# Patient Record
Sex: FEMALE | Race: WHITE | Employment: UNEMPLOYED | URBAN - METROPOLITAN AREA
[De-identification: names, ages, dates, MRNs, and addresses within clinical notes are randomized per-mention and may not be internally consistent; named-entity substitution may affect disease eponyms.]

---

## 2019-11-06 ENCOUNTER — HOSPITAL ENCOUNTER (OUTPATIENT)
Age: 19
Discharge: HOME | End: 2019-11-06
Attending: EMERGENCY MEDICINE
Payer: COMMERCIAL

## 2019-11-06 ENCOUNTER — APPOINTMENT (OUTPATIENT)
Dept: GENERAL RADIOLOGY | Age: 19
End: 2019-11-06
Attending: EMERGENCY MEDICINE
Payer: COMMERCIAL

## 2019-11-06 VITALS
OXYGEN SATURATION: 99 % | RESPIRATION RATE: 16 BRPM | HEART RATE: 88 BPM | SYSTOLIC BLOOD PRESSURE: 145 MMHG | DIASTOLIC BLOOD PRESSURE: 73 MMHG | TEMPERATURE: 99 F

## 2019-11-06 DIAGNOSIS — H66.90 ACUTE OTITIS MEDIA, UNSPECIFIED OTITIS MEDIA TYPE: Primary | ICD-10-CM

## 2019-11-06 DIAGNOSIS — J98.01 ACUTE BRONCHOSPASM: ICD-10-CM

## 2019-11-06 PROCEDURE — 99204 OFFICE O/P NEW MOD 45 MIN: CPT

## 2019-11-06 PROCEDURE — 71046 X-RAY EXAM CHEST 2 VIEWS: CPT | Performed by: EMERGENCY MEDICINE

## 2019-11-06 PROCEDURE — 94640 AIRWAY INHALATION TREATMENT: CPT

## 2019-11-06 RX ORDER — PREDNISONE 20 MG/1
60 TABLET ORAL DAILY
Qty: 15 TABLET | Refills: 0 | Status: SHIPPED | OUTPATIENT
Start: 2019-11-06 | End: 2019-11-11

## 2019-11-06 RX ORDER — ALBUTEROL SULFATE 2.5 MG/3ML
2.5 SOLUTION RESPIRATORY (INHALATION) ONCE
Status: COMPLETED | OUTPATIENT
Start: 2019-11-06 | End: 2019-11-06

## 2019-11-06 RX ORDER — ALBUTEROL SULFATE 90 UG/1
2 AEROSOL, METERED RESPIRATORY (INHALATION) EVERY 4 HOURS PRN
Qty: 1 INHALER | Refills: 0 | Status: SHIPPED | OUTPATIENT
Start: 2019-11-06 | End: 2019-12-06

## 2019-11-06 RX ORDER — AZITHROMYCIN 250 MG/1
TABLET, FILM COATED ORAL
Qty: 1 PACKAGE | Refills: 0 | Status: SHIPPED | OUTPATIENT
Start: 2019-11-06 | End: 2019-11-11

## 2019-11-07 NOTE — ED INITIAL ASSESSMENT (HPI)
Pt with c/o for the past few weeks. Pt c/o nasal congestion and right ear pain. Pt states sudden ear pain this morning when blowing nose. Pt denies fevers.   Pt does vape daily

## 2019-11-07 NOTE — ED PROVIDER NOTES
Patient Seen in: [address] [facility] [place]      History   Patient presents with:  Cough/URI    Stated Complaint: cough,congestion    HPI    This is a 18-year-old female who arrives here with complaints of symptoms of the last 2 to 3 weeks.   She edson Regular without murmurs    Abdomen: Soft nontender without any rebound, guarding    Extremity: There is no rash . There is is good pulses bilaterally. There is no calf tenderness bilaterally. There is no edema    Neuro: Alert and oriented.   Patient is m Tab  500 mg once followed by 250 mg daily x 4 days  Qty: 1 Package Refills: 0

## 2020-09-07 ENCOUNTER — HOSPITAL ENCOUNTER (OUTPATIENT)
Age: 20
Discharge: HOME | End: 2020-09-07
Payer: COMMERCIAL

## 2020-09-07 ENCOUNTER — APPOINTMENT (OUTPATIENT)
Dept: GENERAL RADIOLOGY | Age: 20
End: 2020-09-07
Attending: NURSE PRACTITIONER
Payer: COMMERCIAL

## 2020-09-07 VITALS
OXYGEN SATURATION: 99 % | SYSTOLIC BLOOD PRESSURE: 127 MMHG | HEART RATE: 96 BPM | RESPIRATION RATE: 16 BRPM | TEMPERATURE: 98 F | DIASTOLIC BLOOD PRESSURE: 78 MMHG

## 2020-09-07 DIAGNOSIS — L97.512 SKIN ULCER OF TOE OF RIGHT FOOT WITH FAT LAYER EXPOSED (HCC): Primary | ICD-10-CM

## 2020-09-07 DIAGNOSIS — L03.031 CELLULITIS OF SECOND TOE OF RIGHT FOOT: ICD-10-CM

## 2020-09-07 PROCEDURE — 73660 X-RAY EXAM OF TOE(S): CPT | Performed by: NURSE PRACTITIONER

## 2020-09-07 PROCEDURE — 90471 IMMUNIZATION ADMIN: CPT | Performed by: NURSE PRACTITIONER

## 2020-09-07 PROCEDURE — 90715 TDAP VACCINE 7 YRS/> IM: CPT | Performed by: NURSE PRACTITIONER

## 2020-09-07 PROCEDURE — 99213 OFFICE O/P EST LOW 20 MIN: CPT | Performed by: NURSE PRACTITIONER

## 2020-09-07 PROCEDURE — L3260 AMBULATORY SURGICAL BOOT EAC: HCPCS | Performed by: NURSE PRACTITIONER

## 2020-09-07 RX ORDER — CEFADROXIL 500 MG/1
500 CAPSULE ORAL 2 TIMES DAILY
Qty: 20 CAPSULE | Refills: 0 | Status: SHIPPED | OUTPATIENT
Start: 2020-09-07 | End: 2020-09-17

## 2020-09-07 NOTE — ED PROVIDER NOTES
Patient Seen in: THE [facility] [place]      History   Patient presents with:  Cellulitis    Stated Complaint: right toe injury    HPI  50-year-old female presents to the immediate care complaining of right second toe redness and ulcers.   She ha Heart sounds: Normal heart sounds. Pulmonary:      Effort: Pulmonary effort is normal.      Breath sounds: Normal breath sounds. Musculoskeletal:        Feet:    Skin:     General: Skin is warm and dry.       Capillary Refill: Capillary refill takes PROCEDURE:  XR TOE(S) (MIN 2 VIEWS), RIGHT 3RD (CPT=73660)  TECHNIQUE:  Three views were obtained.   COMPARISON:  [facility], XR, XR TOE(S) (MIN 2 VIEWS), RIGHT 2ND (CPT=73660), 9/07/2020, 12:24 PM.  INDICATIONS:  right toe injury  PATIENT

## 2020-09-07 NOTE — ED INITIAL ASSESSMENT (HPI)
Pt states she hit her right 2nd toe hard on a door last week. States she bandaged it too tight and developed blisters. States keeping it clean with antibiotic cream and yesterday noticed it was painful.  Pt looked at it and noticed it was red, swollen and t

## 2020-09-09 ENCOUNTER — OFFICE VISIT (OUTPATIENT)
Dept: PODIATRY CLINIC | Facility: CLINIC | Age: 20
End: 2020-09-09
Payer: COMMERCIAL

## 2020-09-09 DIAGNOSIS — M20.40 HAMMER TOE, UNSPECIFIED LATERALITY: ICD-10-CM

## 2020-09-09 DIAGNOSIS — L03.031 CELLULITIS OF TOE OF RIGHT FOOT: ICD-10-CM

## 2020-09-09 DIAGNOSIS — L97.519 ULCER OF TOE OF RIGHT FOOT, UNSPECIFIED ULCER STAGE (HCC): Primary | ICD-10-CM

## 2020-09-09 PROCEDURE — 99203 OFFICE O/P NEW LOW 30 MIN: CPT | Performed by: PODIATRIST

## 2020-09-09 RX ORDER — AMOXICILLIN AND CLAVULANATE POTASSIUM 500; 125 MG/1; MG/1
1 TABLET, FILM COATED ORAL 2 TIMES DAILY
Qty: 20 TABLET | Refills: 0 | Status: SHIPPED | OUTPATIENT
Start: 2020-09-09 | End: 2021-04-23

## 2020-09-09 NOTE — PROGRESS NOTES
Giovanny Bal is a 23year old female. Patient presents with:  Consult: right 2nd toe -- Stubbed toe a couple of weeks ago at home on door frame and wrapped toe to tight. Rates pain 4/10. Taking oral abx. Denies any drainage. Xrays taken.        HPI: tablet 2   • ibuprofen 600 MG Oral Tab Take 600 mg by mouth every 6 (six) hours as needed for Pain. Past Medical History:   Diagnosis Date   • Depression    • Self-inflicted injury       History reviewed. No pertinent surgical history.    Family Hist cm with minimal localized redness which she states is much better than what it was with treatment with antibiotics.   The area of the blistering she describes on lateral aspect of her right second toe, the medial aspect of her right third toe, and the later is to wear the accommodative foam toe spacer daily as directed in her right first webspace to better offload the area of the ulceration daily as directed.   In addition, she is to wear the, accommodative Silipos toe spacers in her right second and third web

## 2020-09-11 NOTE — PROGRESS NOTES
Reviewed culture results. Preliminary aerobic results show no growth. Anaerobic results are still pending.   [id number] Ritika Fall 9/11/2020

## 2020-09-16 ENCOUNTER — OFFICE VISIT (OUTPATIENT)
Dept: PODIATRY CLINIC | Facility: CLINIC | Age: 20
End: 2020-09-16
Payer: COMMERCIAL

## 2020-09-16 DIAGNOSIS — L97.519 ULCER OF TOE OF RIGHT FOOT, UNSPECIFIED ULCER STAGE (HCC): Primary | ICD-10-CM

## 2020-09-16 DIAGNOSIS — L03.031 CELLULITIS OF TOE OF RIGHT FOOT: ICD-10-CM

## 2020-09-16 DIAGNOSIS — M20.40 HAMMER TOE, UNSPECIFIED LATERALITY: ICD-10-CM

## 2020-09-16 PROCEDURE — 99213 OFFICE O/P EST LOW 20 MIN: CPT | Performed by: PODIATRIST

## 2020-09-16 RX ORDER — AMOXICILLIN AND CLAVULANATE POTASSIUM 500; 125 MG/1; MG/1
1 TABLET, FILM COATED ORAL 2 TIMES DAILY
Qty: 14 TABLET | Refills: 0 | Status: SHIPPED | OUTPATIENT
Start: 2020-09-16 | End: 2021-04-23

## 2020-09-16 NOTE — PROGRESS NOTES
Hugo Nobles is a 23year old female. Patient presents with: Follow - Up: 2nd toe right foot ulcer - denies any pain. HPI:     This 13-year-old female patient presents to clinic today for follow-up.   She states of the affected areas on her toes on History    Socioeconomic History      Marital status: Single      Spouse name: Not on file      Number of children: Not on file      Years of education: Not on file      Highest education level: Not on file    Tobacco Use      Smoking status: Former Smoker RERERRAL TO WOUND VISIT    Cellulitis of toe of right foot  -     Amoxicillin-Pot Clavulanate (AUGMENTIN) 500-125 MG Oral Tab; Take 1 tablet by mouth 2 (two) times a day.   -     OP RERERRAL TO WOUND VISIT    Hammer toe, unspecified laterality  -     Amoxic given a referral for a wound care consult for further evaluation and treatment of her ulcer with instructions to call the wound care clinic and to make an appointment.   With instructions to discussed the importance of compliance and the risks of noncomplia

## 2020-09-16 NOTE — PROGRESS NOTES
Reviewed final aerobic and anaerobic culture results both of which showed no growth.   [id number] Ritika [facility] 9/15/20

## 2020-09-23 ENCOUNTER — OFFICE VISIT (OUTPATIENT)
Dept: PODIATRY CLINIC | Facility: CLINIC | Age: 20
End: 2020-09-23
Payer: COMMERCIAL

## 2020-09-23 DIAGNOSIS — M20.40 HAMMER TOE, UNSPECIFIED LATERALITY: ICD-10-CM

## 2020-09-23 DIAGNOSIS — L97.519 ULCER OF TOE OF RIGHT FOOT, UNSPECIFIED ULCER STAGE (HCC): Primary | ICD-10-CM

## 2020-09-23 DIAGNOSIS — L03.031 CELLULITIS OF TOE OF RIGHT FOOT: ICD-10-CM

## 2020-09-23 PROCEDURE — 99213 OFFICE O/P EST LOW 20 MIN: CPT | Performed by: PODIATRIST

## 2020-09-23 RX ORDER — AMOXICILLIN AND CLAVULANATE POTASSIUM 875; 125 MG/1; MG/1
1 TABLET, FILM COATED ORAL 2 TIMES DAILY
Qty: 20 TABLET | Refills: 0 | Status: SHIPPED | OUTPATIENT
Start: 2020-09-23 | End: 2021-04-23

## 2020-11-14 ENCOUNTER — APPOINTMENT (OUTPATIENT)
Dept: GENERAL RADIOLOGY | Age: 20
End: 2020-11-14
Attending: PHYSICIAN ASSISTANT
Payer: COMMERCIAL

## 2020-11-14 ENCOUNTER — HOSPITAL ENCOUNTER (OUTPATIENT)
Age: 20
Discharge: HOME | End: 2020-11-14
Payer: COMMERCIAL

## 2020-11-14 VITALS
DIASTOLIC BLOOD PRESSURE: 66 MMHG | SYSTOLIC BLOOD PRESSURE: 125 MMHG | RESPIRATION RATE: 16 BRPM | TEMPERATURE: 99 F | HEART RATE: 80 BPM | OXYGEN SATURATION: 99 %

## 2020-11-14 DIAGNOSIS — R07.89 CHEST PAIN, MUSCULOSKELETAL: Primary | ICD-10-CM

## 2020-11-14 PROCEDURE — 93000 ELECTROCARDIOGRAM COMPLETE: CPT | Performed by: PHYSICIAN ASSISTANT

## 2020-11-14 PROCEDURE — 99214 OFFICE O/P EST MOD 30 MIN: CPT | Performed by: PHYSICIAN ASSISTANT

## 2020-11-14 PROCEDURE — 71046 X-RAY EXAM CHEST 2 VIEWS: CPT | Performed by: PHYSICIAN ASSISTANT

## 2020-11-14 NOTE — ED PROVIDER NOTES
Patient Seen in: Immediate [address] [place] [address]      History   Patient presents with:  Chest Pain: Entered by patient    Stated Complaint: CHEST PAIN WHEN BREATHING IN    HPI    19-year-old female with past medical history as noted below presents to the immediate Neck:      Musculoskeletal: Normal range of motion and neck supple. Cardiovascular:      Rate and Rhythm: Normal rate and regular rhythm. Pulmonary:      Breath sounds: Normal breath sounds. Chest:      Chest wall: Tenderness (midsternal) present.

## 2020-11-14 NOTE — ED INITIAL ASSESSMENT (HPI)
Pt c/o mid sternal chest pain. Pain present for 2 weeks but worse over the last couple of days. Patient reports pain is worse with movement and is tender over sternum.

## 2022-01-19 ENCOUNTER — OFFICE VISIT (OUTPATIENT)
Dept: FAMILY MEDICINE CLINIC | Facility: CLINIC | Age: 22
End: 2022-01-19
Payer: COMMERCIAL

## 2022-01-19 VITALS
SYSTOLIC BLOOD PRESSURE: 108 MMHG | WEIGHT: 161 LBS | DIASTOLIC BLOOD PRESSURE: 72 MMHG | BODY MASS INDEX: 23.85 KG/M2 | RESPIRATION RATE: 16 BRPM | OXYGEN SATURATION: 98 % | TEMPERATURE: 97 F | HEIGHT: 69 IN | HEART RATE: 101 BPM

## 2022-01-19 DIAGNOSIS — R10.9 ABDOMINAL DISCOMFORT: Primary | ICD-10-CM

## 2022-01-19 PROBLEM — F60.3 BORDERLINE PERSONALITY DISORDER (HCC): Status: ACTIVE | Noted: 2022-01-19

## 2022-01-19 PROCEDURE — 3008F BODY MASS INDEX DOCD: CPT | Performed by: PHYSICIAN ASSISTANT

## 2022-01-19 PROCEDURE — 3078F DIAST BP <80 MM HG: CPT | Performed by: PHYSICIAN ASSISTANT

## 2022-01-19 PROCEDURE — 99203 OFFICE O/P NEW LOW 30 MIN: CPT | Performed by: PHYSICIAN ASSISTANT

## 2022-01-19 PROCEDURE — 3074F SYST BP LT 130 MM HG: CPT | Performed by: PHYSICIAN ASSISTANT

## 2022-01-19 NOTE — PROGRESS NOTES
Patient presents with:  Establish Care: New Patient   Pain: LLQ Abd Pain x1week       HISTORY OF PRESENT ILLNESS  Shobha Owens is a 24year old female who presents for  - LLQ discomfort: Feels like a \"hollow baseball\" for the last week.  Feels better appropriately dressed. CARDIOVASCULAR: Regular rate and rhythm. PULMONOLOGY: Normal effort on room air. Clear to auscultation bilaterally. ABDOMEN: Soft, nondistended. Mild left sided abdominal tenderness without rebound or guarding.  No hepatosplenomega

## 2022-01-26 ENCOUNTER — OFFICE VISIT (OUTPATIENT)
Dept: FAMILY MEDICINE CLINIC | Facility: CLINIC | Age: 22
End: 2022-01-26
Payer: COMMERCIAL

## 2022-01-26 VITALS
HEIGHT: 69 IN | OXYGEN SATURATION: 99 % | HEART RATE: 99 BPM | TEMPERATURE: 98 F | BODY MASS INDEX: 23.7 KG/M2 | WEIGHT: 160 LBS | DIASTOLIC BLOOD PRESSURE: 72 MMHG | SYSTOLIC BLOOD PRESSURE: 104 MMHG | RESPIRATION RATE: 16 BRPM

## 2022-01-26 DIAGNOSIS — R10.9 LEFT SIDED ABDOMINAL PAIN: Primary | ICD-10-CM

## 2022-01-26 PROCEDURE — 3008F BODY MASS INDEX DOCD: CPT | Performed by: PHYSICIAN ASSISTANT

## 2022-01-26 PROCEDURE — 3078F DIAST BP <80 MM HG: CPT | Performed by: PHYSICIAN ASSISTANT

## 2022-01-26 PROCEDURE — 99213 OFFICE O/P EST LOW 20 MIN: CPT | Performed by: PHYSICIAN ASSISTANT

## 2022-01-26 PROCEDURE — 3074F SYST BP LT 130 MM HG: CPT | Performed by: PHYSICIAN ASSISTANT

## 2022-01-27 ENCOUNTER — HOSPITAL ENCOUNTER (OUTPATIENT)
Dept: GENERAL RADIOLOGY | Age: 22
Discharge: HOME | End: 2022-01-27
Attending: PHYSICIAN ASSISTANT
Payer: COMMERCIAL

## 2022-01-27 DIAGNOSIS — R10.9 LEFT SIDED ABDOMINAL PAIN: ICD-10-CM

## 2022-01-27 PROCEDURE — 74019 RADEX ABDOMEN 2 VIEWS: CPT | Performed by: PHYSICIAN ASSISTANT

## 2022-01-31 ENCOUNTER — LAB ENCOUNTER (OUTPATIENT)
Dept: LAB | Age: 22
End: 2022-01-31
Attending: PHYSICIAN ASSISTANT
Payer: COMMERCIAL

## 2022-01-31 DIAGNOSIS — R10.9 LEFT SIDED ABDOMINAL PAIN: ICD-10-CM

## 2022-01-31 PROCEDURE — 83993 ASSAY FOR CALPROTECTIN FECAL: CPT

## 2022-01-31 PROCEDURE — 87338 HPYLORI STOOL AG IA: CPT

## 2022-01-31 NOTE — PROGRESS NOTES
Patient presents with:  Abdominal Pain: LLQ, ongoing       HISTORY OF PRESENT ILLNESS  Jose C Jeremias is a 24year old female who presents for follow up left sided abdominal pain.  Notes that she had slight relief with mag citrate but not complete improve dressed. CARDIOVASCULAR: Regular rate and rhythm. PULMONOLOGY: Normal effort on room air. Clear to auscultation bilaterally. ABDOMEN: Soft, nondistended. Mild tenderness left side of abdomen without rebound or guarding. No hepatosplenomegaly.  No CVA ten

## 2022-02-02 LAB — HELICOBACTER PYLORI AG, FECAL: NEGATIVE

## 2022-02-03 LAB — CALPROTECTIN, FECAL: 76 UG/G

## 2022-06-28 ENCOUNTER — TELEPHONE (OUTPATIENT)
Dept: FAMILY MEDICINE CLINIC | Facility: CLINIC | Age: 22
End: 2022-06-28

## 2022-06-28 NOTE — TELEPHONE ENCOUNTER
[facility] [address] [place] [place] [address] from 02/10/2020 to present. All records located in [address] [place] [address] in which request was sent to [employer]. Contact Yana BENITO6 [phone] ext [phone]  Case ID # [id number]

## 2022-07-25 ENCOUNTER — LAB ENCOUNTER (OUTPATIENT)
Dept: LAB | Facility: HOSPITAL | Age: 22
End: 2022-07-25
Attending: INTERNAL MEDICINE
Payer: COMMERCIAL

## 2022-07-25 DIAGNOSIS — Z01.812 ENCOUNTER FOR PREPROCEDURE SCREENING LABORATORY TESTING FOR COVID-19: ICD-10-CM

## 2022-07-25 DIAGNOSIS — Z20.822 ENCOUNTER FOR PREPROCEDURE SCREENING LABORATORY TESTING FOR COVID-19: ICD-10-CM

## 2022-07-25 LAB — SARS-COV-2 RNA RESP QL NAA+PROBE: NOT DETECTED

## 2022-09-23 ENCOUNTER — LAB ENCOUNTER (OUTPATIENT)
Dept: LAB | Facility: HOSPITAL | Age: 22
End: 2022-09-23
Attending: INTERNAL MEDICINE

## 2022-09-23 DIAGNOSIS — K92.1 HEMATOCHEZIA: ICD-10-CM

## 2022-09-23 DIAGNOSIS — R14.0 BLOATING: ICD-10-CM

## 2022-09-23 DIAGNOSIS — R10.9 LEFT SIDED ABDOMINAL PAIN: ICD-10-CM

## 2022-09-23 LAB
BASOPHILS # BLD AUTO: 0.05 X10(3) UL (ref 0–0.2)
BASOPHILS NFR BLD AUTO: 1.2 %
EOSINOPHIL # BLD AUTO: 0.14 X10(3) UL (ref 0–0.7)
EOSINOPHIL NFR BLD AUTO: 3.3 %
ERYTHROCYTE [DISTWIDTH] IN BLOOD BY AUTOMATED COUNT: 12.2 %
HCT VFR BLD AUTO: 38.2 %
HGB BLD-MCNC: 12.7 G/DL
IGA SERPL-MCNC: 267 MG/DL (ref 70–312)
IMM GRANULOCYTES # BLD AUTO: 0.01 X10(3) UL (ref 0–1)
IMM GRANULOCYTES NFR BLD: 0.2 %
LYMPHOCYTES # BLD AUTO: 1.34 X10(3) UL (ref 1–4)
LYMPHOCYTES NFR BLD AUTO: 32.1 %
MCH RBC QN AUTO: 28.9 PG (ref 26–34)
MCHC RBC AUTO-ENTMCNC: 33.2 G/DL (ref 31–37)
MCV RBC AUTO: 86.8 FL
MONOCYTES # BLD AUTO: 0.27 X10(3) UL (ref 0.1–1)
MONOCYTES NFR BLD AUTO: 6.5 %
NEUTROPHILS # BLD AUTO: 2.37 X10 (3) UL (ref 1.5–7.7)
NEUTROPHILS # BLD AUTO: 2.37 X10(3) UL (ref 1.5–7.7)
NEUTROPHILS NFR BLD AUTO: 56.7 %
PLATELET # BLD AUTO: 247 10(3)UL (ref 150–450)
RBC # BLD AUTO: 4.4 X10(6)UL
WBC # BLD AUTO: 4.2 X10(3) UL (ref 4–11)

## 2022-09-23 PROCEDURE — 82784 ASSAY IGA/IGD/IGG/IGM EACH: CPT

## 2022-09-23 PROCEDURE — 36415 COLL VENOUS BLD VENIPUNCTURE: CPT

## 2022-09-23 PROCEDURE — 85025 COMPLETE CBC W/AUTO DIFF WBC: CPT

## 2022-09-27 ENCOUNTER — OFFICE VISIT (OUTPATIENT)
Dept: FAMILY MEDICINE CLINIC | Facility: CLINIC | Age: 22
End: 2022-09-27
Payer: COMMERCIAL

## 2022-09-27 VITALS
SYSTOLIC BLOOD PRESSURE: 120 MMHG | HEIGHT: 68 IN | DIASTOLIC BLOOD PRESSURE: 70 MMHG | RESPIRATION RATE: 16 BRPM | HEART RATE: 74 BPM | BODY MASS INDEX: 22.53 KG/M2 | WEIGHT: 148.63 LBS

## 2022-09-27 DIAGNOSIS — S91.019A: Primary | ICD-10-CM

## 2022-10-06 ENCOUNTER — LAB ENCOUNTER (OUTPATIENT)
Dept: LAB | Age: 22
End: 2022-10-06
Attending: FAMILY MEDICINE
Payer: COMMERCIAL

## 2022-10-06 DIAGNOSIS — R14.0 BLOATING: ICD-10-CM

## 2022-10-06 DIAGNOSIS — R10.9 LEFT SIDED ABDOMINAL PAIN: ICD-10-CM

## 2022-10-06 LAB
CRYPTOSP AG STL QL IA: NEGATIVE
G LAMBLIA AG STL QL IA: NEGATIVE

## 2022-10-06 PROCEDURE — 87329 GIARDIA AG IA: CPT

## 2022-10-06 PROCEDURE — 83993 ASSAY FOR CALPROTECTIN FECAL: CPT

## 2022-10-06 PROCEDURE — 82656 EL-1 FECAL QUAL/SEMIQ: CPT

## 2022-10-06 PROCEDURE — 82705 FATS/LIPIDS FECES QUAL: CPT

## 2022-10-06 PROCEDURE — 87272 CRYPTOSPORIDIUM AG IF: CPT

## 2022-10-08 LAB
NEUTRAL FAT, FECAL: NORMAL
SPLIT FAT, FECAL: NORMAL

## 2022-10-10 LAB — CALPROTECTIN STL-MCNT: 5.87 ΜG/G (ref ?–50)

## 2022-10-11 LAB — PANCREATIC ELASTASE , FECAL: 224 UG/G

## 2022-11-08 PROBLEM — R10.84 GENERALIZED ABDOMINAL PAIN: Status: ACTIVE | Noted: 2022-11-08

## 2022-11-08 PROBLEM — K59.00 CONSTIPATION: Status: ACTIVE | Noted: 2022-11-08

## 2022-11-08 PROBLEM — R63.4 WEIGHT LOSS: Status: ACTIVE | Noted: 2022-11-08

## 2022-11-14 ENCOUNTER — HOSPITAL ENCOUNTER (OUTPATIENT)
Dept: NUCLEAR MEDICINE | Facility: HOSPITAL | Age: 22
Discharge: HOME | End: 2022-11-14
Attending: INTERNAL MEDICINE
Payer: COMMERCIAL

## 2022-11-14 DIAGNOSIS — R10.84 GENERALIZED ABDOMINAL PAIN: ICD-10-CM

## 2022-11-14 PROCEDURE — 78227 HEPATOBIL SYST IMAGE W/DRUG: CPT | Performed by: INTERNAL MEDICINE

## 2022-11-15 NOTE — PROGRESS NOTES
HIDA scan noted with normal EF but with pain with CCK administration. We will place referral for general surgery. Mychart message sent.

## 2022-12-01 ENCOUNTER — OFFICE VISIT (OUTPATIENT)
Facility: LOCATION | Age: 22
End: 2022-12-01
Payer: COMMERCIAL

## 2022-12-01 VITALS — HEART RATE: 80 BPM | TEMPERATURE: 97 F

## 2022-12-01 DIAGNOSIS — R10.11 RIGHT UPPER QUADRANT ABDOMINAL PAIN: Primary | ICD-10-CM

## 2022-12-01 PROCEDURE — 99204 OFFICE O/P NEW MOD 45 MIN: CPT | Performed by: SURGERY

## 2022-12-02 ENCOUNTER — PATIENT MESSAGE (OUTPATIENT)
Dept: FAMILY MEDICINE CLINIC | Facility: CLINIC | Age: 22
End: 2022-12-02

## 2022-12-15 NOTE — PROGRESS NOTES
Krisit Huntley is a 23year old female. Patient presents with: Follow - Up: 2nd toe right foot ulcer - denies any pain    HPI:     This 18-year-old female patient returns to clinic today for follow-up of the ulcer on her right second toe.   She returns t Ok thank you for update! For now let's continue with current medication and when checking bp at home please rest for at least 5 to 10 minutes before she checks her pressure.      Female          of heroin overdose   • Bipolar Disorder Maternal Aunt    • Depression Paternal Aunt    • Diabetes Neg    • Obesity Neg    • Psychiatric Neg       Social History    Socioeconomic History      Marital status: Single      Spouse name: Not groups are graded 5 out of 5 in the foot and ankle.     ASSESSMENT AND PLAN:   Diagnoses and all orders for this visit:    Ulcer of toe of right foot, unspecified ulcer stage (HCC)    Cellulitis of toe of right foot    Hammer toe, unspecified laterality e-scribed this date. Reviewed the various acute manifestations of infection and informed her to go to the nearest ER if signs/symptoms worsen and/or if she experiences any new manifestations.   She will monitor the ulcerated area along with the remainder

## 2022-12-19 ENCOUNTER — OFFICE VISIT (OUTPATIENT)
Dept: FAMILY MEDICINE CLINIC | Facility: CLINIC | Age: 22
End: 2022-12-19
Payer: COMMERCIAL

## 2022-12-19 VITALS
RESPIRATION RATE: 16 BRPM | DIASTOLIC BLOOD PRESSURE: 60 MMHG | BODY MASS INDEX: 20.27 KG/M2 | SYSTOLIC BLOOD PRESSURE: 100 MMHG | TEMPERATURE: 98 F | HEIGHT: 69.5 IN | HEART RATE: 78 BPM | WEIGHT: 140 LBS

## 2022-12-19 DIAGNOSIS — R25.1 TREMULOUSNESS: ICD-10-CM

## 2022-12-19 DIAGNOSIS — R63.4 WEIGHT LOSS, UNINTENTIONAL: Primary | ICD-10-CM

## 2022-12-19 DIAGNOSIS — F41.9 ANXIETY: ICD-10-CM

## 2022-12-19 DIAGNOSIS — Z83.49 FAMILY HISTORY OF THYROID DISEASE: ICD-10-CM

## 2022-12-19 PROCEDURE — 3078F DIAST BP <80 MM HG: CPT | Performed by: STUDENT IN AN ORGANIZED HEALTH CARE EDUCATION/TRAINING PROGRAM

## 2022-12-19 PROCEDURE — 99213 OFFICE O/P EST LOW 20 MIN: CPT | Performed by: STUDENT IN AN ORGANIZED HEALTH CARE EDUCATION/TRAINING PROGRAM

## 2022-12-19 PROCEDURE — 3008F BODY MASS INDEX DOCD: CPT | Performed by: STUDENT IN AN ORGANIZED HEALTH CARE EDUCATION/TRAINING PROGRAM

## 2022-12-19 PROCEDURE — 3074F SYST BP LT 130 MM HG: CPT | Performed by: STUDENT IN AN ORGANIZED HEALTH CARE EDUCATION/TRAINING PROGRAM

## 2022-12-19 RX ORDER — LINACLOTIDE 290 UG/1
290 CAPSULE, GELATIN COATED ORAL DAILY
COMMUNITY
Start: 2022-11-28 | End: 2022-12-19 | Stop reason: ALTCHOICE

## 2023-01-05 ENCOUNTER — LAB ENCOUNTER (OUTPATIENT)
Dept: LAB | Age: 23
End: 2023-01-05
Attending: STUDENT IN AN ORGANIZED HEALTH CARE EDUCATION/TRAINING PROGRAM
Payer: COMMERCIAL

## 2023-01-05 DIAGNOSIS — Z83.49 FAMILY HISTORY OF THYROID DISEASE: ICD-10-CM

## 2023-01-05 DIAGNOSIS — R25.1 TREMULOUSNESS: ICD-10-CM

## 2023-01-05 DIAGNOSIS — F41.9 ANXIETY: ICD-10-CM

## 2023-01-05 LAB
ALBUMIN SERPL-MCNC: 3.9 G/DL (ref 3.4–5)
ALBUMIN/GLOB SERPL: 1.2 {RATIO} (ref 1–2)
ALP LIVER SERPL-CCNC: 53 U/L
ALT SERPL-CCNC: 21 U/L
ANION GAP SERPL CALC-SCNC: 9 MMOL/L (ref 0–18)
AST SERPL-CCNC: 17 U/L (ref 15–37)
BASOPHILS # BLD AUTO: 0.06 X10(3) UL (ref 0–0.2)
BASOPHILS NFR BLD AUTO: 1.3 %
BILIRUB SERPL-MCNC: 0.4 MG/DL (ref 0.1–2)
BUN BLD-MCNC: 9 MG/DL (ref 7–18)
CALCIUM BLD-MCNC: 8.7 MG/DL (ref 8.5–10.1)
CHLORIDE SERPL-SCNC: 112 MMOL/L (ref 98–112)
CO2 SERPL-SCNC: 21 MMOL/L (ref 21–32)
CREAT BLD-MCNC: 0.85 MG/DL
EOSINOPHIL # BLD AUTO: 0.12 X10(3) UL (ref 0–0.7)
EOSINOPHIL NFR BLD AUTO: 2.6 %
ERYTHROCYTE [DISTWIDTH] IN BLOOD BY AUTOMATED COUNT: 12.4 %
FASTING STATUS PATIENT QL REPORTED: YES
GFR SERPLBLD BASED ON 1.73 SQ M-ARVRAT: 99 ML/MIN/1.73M2 (ref 60–?)
GLOBULIN PLAS-MCNC: 3.2 G/DL (ref 2.8–4.4)
GLUCOSE BLD-MCNC: 98 MG/DL (ref 70–99)
HCT VFR BLD AUTO: 39.2 %
HGB BLD-MCNC: 13 G/DL
IMM GRANULOCYTES # BLD AUTO: 0.01 X10(3) UL (ref 0–1)
IMM GRANULOCYTES NFR BLD: 0.2 %
LYMPHOCYTES # BLD AUTO: 1.25 X10(3) UL (ref 1–4)
LYMPHOCYTES NFR BLD AUTO: 26.7 %
MCH RBC QN AUTO: 29 PG (ref 26–34)
MCHC RBC AUTO-ENTMCNC: 33.2 G/DL (ref 31–37)
MCV RBC AUTO: 87.3 FL
MONOCYTES # BLD AUTO: 0.27 X10(3) UL (ref 0.1–1)
MONOCYTES NFR BLD AUTO: 5.8 %
NEUTROPHILS # BLD AUTO: 2.98 X10 (3) UL (ref 1.5–7.7)
NEUTROPHILS # BLD AUTO: 2.98 X10(3) UL (ref 1.5–7.7)
NEUTROPHILS NFR BLD AUTO: 63.4 %
OSMOLALITY SERPL CALC.SUM OF ELEC: 293 MOSM/KG (ref 275–295)
PLATELET # BLD AUTO: 217 10(3)UL (ref 150–450)
POTASSIUM SERPL-SCNC: 4 MMOL/L (ref 3.5–5.1)
PROT SERPL-MCNC: 7.1 G/DL (ref 6.4–8.2)
RBC # BLD AUTO: 4.49 X10(6)UL
SODIUM SERPL-SCNC: 142 MMOL/L (ref 136–145)
TSI SER-ACNC: 2.81 MIU/ML (ref 0.36–3.74)
WBC # BLD AUTO: 4.7 X10(3) UL (ref 4–11)

## 2023-01-05 PROCEDURE — 36415 COLL VENOUS BLD VENIPUNCTURE: CPT

## 2023-01-05 PROCEDURE — 80053 COMPREHEN METABOLIC PANEL: CPT

## 2023-01-05 PROCEDURE — 85025 COMPLETE CBC W/AUTO DIFF WBC: CPT

## 2023-01-05 PROCEDURE — 84443 ASSAY THYROID STIM HORMONE: CPT

## 2023-02-05 ENCOUNTER — HOSPITAL ENCOUNTER (OUTPATIENT)
Dept: ULTRASOUND IMAGING | Age: 23
End: 2023-02-05
Attending: SURGERY
Payer: COMMERCIAL

## 2023-02-05 ENCOUNTER — HOSPITAL ENCOUNTER (OUTPATIENT)
Dept: ULTRASOUND IMAGING | Age: 23
Discharge: HOME | End: 2023-02-05
Attending: SURGERY
Payer: COMMERCIAL

## 2023-02-05 DIAGNOSIS — R10.11 RIGHT UPPER QUADRANT ABDOMINAL PAIN: ICD-10-CM

## 2023-02-05 PROCEDURE — 76700 US EXAM ABDOM COMPLETE: CPT | Performed by: SURGERY

## 2023-02-15 ENCOUNTER — PATIENT MESSAGE (OUTPATIENT)
Facility: LOCATION | Age: 23
End: 2023-02-15

## 2023-02-23 ENCOUNTER — OFFICE VISIT (OUTPATIENT)
Facility: LOCATION | Age: 23
End: 2023-02-23
Payer: COMMERCIAL

## 2023-02-23 VITALS — HEART RATE: 72 BPM | TEMPERATURE: 99 F

## 2023-02-23 DIAGNOSIS — K80.50 BILIARY COLIC: Primary | ICD-10-CM

## 2023-02-23 PROCEDURE — 99214 OFFICE O/P EST MOD 30 MIN: CPT | Performed by: SURGERY

## 2023-03-20 ENCOUNTER — LAB ENCOUNTER (OUTPATIENT)
Dept: LAB | Facility: HOSPITAL | Age: 23
End: 2023-03-20
Attending: INTERNAL MEDICINE
Payer: COMMERCIAL

## 2023-03-20 DIAGNOSIS — Z01.818 PRE-OP TESTING: ICD-10-CM

## 2023-03-21 LAB — SARS-COV-2 RNA RESP QL NAA+PROBE: NOT DETECTED

## 2023-03-23 PROBLEM — R14.1 ABDOMINAL GAS PAIN: Status: ACTIVE | Noted: 2023-03-23

## 2023-03-23 PROBLEM — R63.4 LOSS OF WEIGHT: Status: ACTIVE | Noted: 2023-03-23

## 2023-03-23 PROBLEM — R10.9 ABDOMINAL PAIN: Status: ACTIVE | Noted: 2023-03-23

## 2023-03-23 PROBLEM — K63.3 INTESTINAL ULCER: Status: ACTIVE | Noted: 2023-03-23

## 2023-03-23 PROBLEM — K52.9 ILEITIS: Status: ACTIVE | Noted: 2023-03-23

## 2023-03-23 PROBLEM — R10.84 ABDOMINAL PAIN, GENERALIZED: Status: ACTIVE | Noted: 2023-03-23

## 2023-04-08 ENCOUNTER — OFFICE VISIT (OUTPATIENT)
Dept: FAMILY MEDICINE CLINIC | Facility: CLINIC | Age: 23
End: 2023-04-08
Payer: COMMERCIAL

## 2023-04-08 VITALS
BODY MASS INDEX: 20.46 KG/M2 | WEIGHT: 135 LBS | TEMPERATURE: 100 F | RESPIRATION RATE: 16 BRPM | HEIGHT: 68 IN | OXYGEN SATURATION: 99 % | SYSTOLIC BLOOD PRESSURE: 112 MMHG | DIASTOLIC BLOOD PRESSURE: 72 MMHG | HEART RATE: 92 BPM

## 2023-04-08 DIAGNOSIS — J06.9 VIRAL URI WITH COUGH: ICD-10-CM

## 2023-04-08 DIAGNOSIS — H92.03 ACUTE EAR PAIN, BILATERAL: Primary | ICD-10-CM

## 2023-04-08 PROCEDURE — 3078F DIAST BP <80 MM HG: CPT | Performed by: NURSE PRACTITIONER

## 2023-04-08 PROCEDURE — 3074F SYST BP LT 130 MM HG: CPT | Performed by: NURSE PRACTITIONER

## 2023-04-08 PROCEDURE — 99213 OFFICE O/P EST LOW 20 MIN: CPT | Performed by: NURSE PRACTITIONER

## 2023-04-08 PROCEDURE — 3008F BODY MASS INDEX DOCD: CPT | Performed by: NURSE PRACTITIONER

## 2023-04-08 NOTE — PATIENT INSTRUCTIONS
Self care for viral illnesses: Take Covid at home test today to rule out Covid. Follow up with Dr. Jan Washington office if positive. Salt water gargles (1 tsp. Salt in 6 oz lukewarm water), use several times daily to help remove post nasal drainage and soothe throat. Saline nasal spray such as Ocean or Simply Saline to nostrils 2-3 times daily to help soothe tissues and keep mucus thin. Consider Ayr moisturizer to the nose for rehydrating sore tissue. Hydrate! (cold or hot based on comfort). Drink lots of water or other non dehydrating liquids to help with illness. May use humidifier in bedroom at night to help with congestion. Hot steamy showers can loosen congestion and help cough. Carroll's vapor rub to chest can quiet cough. Hand washing-use hand  or wash hands frequently, cover your cough or sneeze, do not share towels or drinks with others. May take over the counter multi symptom medication (Nyquil, Dayquil) for the next 2 days only as directed on package if needed. May use Tylenol or Ibuprofen over the counter for pain/comfort if not included in multi symptom medication. No work or school until fever free for 24 hours. Follow up in 10-14 days after illness started with primary care if symptoms not complete resolved or sooner if worsening symptoms. Seek immediate care if inability to swallow or breathe or if symptoms improve greatly then worsen again.

## 2023-04-10 ENCOUNTER — TELEPHONE (OUTPATIENT)
Dept: FAMILY MEDICINE CLINIC | Facility: CLINIC | Age: 23
End: 2023-04-10

## 2023-04-10 ENCOUNTER — OFFICE VISIT (OUTPATIENT)
Dept: FAMILY MEDICINE CLINIC | Facility: CLINIC | Age: 23
End: 2023-04-10
Payer: COMMERCIAL

## 2023-04-10 VITALS
DIASTOLIC BLOOD PRESSURE: 70 MMHG | HEART RATE: 84 BPM | SYSTOLIC BLOOD PRESSURE: 116 MMHG | WEIGHT: 135 LBS | BODY MASS INDEX: 20.46 KG/M2 | HEIGHT: 68 IN | TEMPERATURE: 100 F | OXYGEN SATURATION: 97 % | RESPIRATION RATE: 18 BRPM

## 2023-04-10 DIAGNOSIS — H92.03 OTALGIA OF BOTH EARS: ICD-10-CM

## 2023-04-10 DIAGNOSIS — J06.9 VIRAL URI: Primary | ICD-10-CM

## 2023-04-10 PROCEDURE — 99213 OFFICE O/P EST LOW 20 MIN: CPT | Performed by: NURSE PRACTITIONER

## 2023-04-10 PROCEDURE — 3074F SYST BP LT 130 MM HG: CPT | Performed by: NURSE PRACTITIONER

## 2023-04-10 PROCEDURE — 3008F BODY MASS INDEX DOCD: CPT | Performed by: NURSE PRACTITIONER

## 2023-04-10 PROCEDURE — 3078F DIAST BP <80 MM HG: CPT | Performed by: NURSE PRACTITIONER

## 2023-04-10 NOTE — PATIENT INSTRUCTIONS
Push fluids  Sudafed 12 hour  Flonase nasal spray  Ibuprofen/ tylenol for pain. Follow up with ENT if ear pain is still not improving with these measures over the next several days  Follow up sooner for any new or worsening symptoms.

## 2023-04-10 NOTE — TELEPHONE ENCOUNTER
Patient calling. States was seen on 4/8. Treated with comfort measures. Has worsening ear pain today. Advised to follow up for reevaluation. Patient states she will come back later today.

## 2023-04-13 ENCOUNTER — TELEPHONE (OUTPATIENT)
Dept: FAMILY MEDICINE CLINIC | Facility: CLINIC | Age: 23
End: 2023-04-13

## 2023-04-13 NOTE — TELEPHONE ENCOUNTER
Received medical records request from [address] [place] [address] requesting patient's medical records from 01/01/2021 to present. All records located in [address] [place] [address] in which request was sent to [employer].  [facility] [phone]  [facility] # [id number]

## 2023-06-20 ENCOUNTER — OFFICE VISIT (OUTPATIENT)
Dept: FAMILY MEDICINE CLINIC | Facility: CLINIC | Age: 23
End: 2023-06-20
Payer: COMMERCIAL

## 2023-06-20 VITALS
HEART RATE: 60 BPM | SYSTOLIC BLOOD PRESSURE: 100 MMHG | DIASTOLIC BLOOD PRESSURE: 60 MMHG | BODY MASS INDEX: 20.61 KG/M2 | RESPIRATION RATE: 16 BRPM | OXYGEN SATURATION: 100 % | TEMPERATURE: 97 F | HEIGHT: 68 IN | WEIGHT: 136 LBS

## 2023-06-20 DIAGNOSIS — J34.3 HYPERTROPHY OF BOTH INFERIOR NASAL TURBINATES: ICD-10-CM

## 2023-06-20 DIAGNOSIS — H69.83 DYSFUNCTION OF BOTH EUSTACHIAN TUBES: Primary | ICD-10-CM

## 2023-06-20 PROCEDURE — 3008F BODY MASS INDEX DOCD: CPT | Performed by: PHYSICIAN ASSISTANT

## 2023-06-20 PROCEDURE — 3074F SYST BP LT 130 MM HG: CPT | Performed by: PHYSICIAN ASSISTANT

## 2023-06-20 PROCEDURE — 99213 OFFICE O/P EST LOW 20 MIN: CPT | Performed by: PHYSICIAN ASSISTANT

## 2023-06-20 PROCEDURE — 3078F DIAST BP <80 MM HG: CPT | Performed by: PHYSICIAN ASSISTANT

## 2023-06-20 RX ORDER — FLUTICASONE PROPIONATE 50 MCG
1 SPRAY, SUSPENSION (ML) NASAL 2 TIMES DAILY
Qty: 16 G | Refills: 0 | Status: SHIPPED | OUTPATIENT
Start: 2023-06-20

## 2023-06-20 RX ORDER — PREDNISONE 20 MG/1
40 TABLET ORAL DAILY
Qty: 6 TABLET | Refills: 0 | Status: SHIPPED | OUTPATIENT
Start: 2023-06-20 | End: 2023-06-23

## 2023-06-22 NOTE — PROGRESS NOTES
Patient was seen by myself and Glenys Funes, [address] student. Changes made to note as necessary to reflect my own assessment and plan.      Emigdio Pearl PA-C

## 2023-07-31 ENCOUNTER — OFFICE VISIT (OUTPATIENT)
Facility: LOCATION | Age: 23
End: 2023-07-31
Payer: COMMERCIAL

## 2023-07-31 DIAGNOSIS — H93.293 ABNORMAL AUDITORY PERCEPTION OF BOTH EARS: Primary | ICD-10-CM

## 2023-07-31 DIAGNOSIS — H91.93 AUDITORY IMPAIRMENT, BILATERAL: ICD-10-CM

## 2023-07-31 DIAGNOSIS — H69.93 UNSPECIFIED EUSTACHIAN TUBE DISORDER, BILATERAL: Primary | ICD-10-CM

## 2023-07-31 PROCEDURE — 99204 OFFICE O/P NEW MOD 45 MIN: CPT | Performed by: OTOLARYNGOLOGY

## 2023-07-31 PROCEDURE — 92567 TYMPANOMETRY: CPT | Performed by: AUDIOLOGIST

## 2023-07-31 PROCEDURE — 92557 COMPREHENSIVE HEARING TEST: CPT | Performed by: AUDIOLOGIST

## 2023-07-31 RX ORDER — METHYLPREDNISOLONE 4 MG/1
TABLET ORAL
Qty: 21 TABLET | Refills: 0 | Status: SHIPPED | OUTPATIENT
Start: 2023-07-31

## 2023-07-31 NOTE — PROGRESS NOTES
Pete Benites was seen for an audiometric evaluation and tympanogram today. Referred back to physician.     Fartun Tillman MS, CCC-A

## 2023-08-08 ENCOUNTER — OFFICE VISIT (OUTPATIENT)
Facility: LOCATION | Age: 23
End: 2023-08-08
Payer: COMMERCIAL

## 2023-08-08 VITALS — HEART RATE: 93 BPM | TEMPERATURE: 98 F

## 2023-08-08 DIAGNOSIS — K80.10 CALCULUS OF GALLBLADDER WITH CHOLECYSTITIS WITHOUT BILIARY OBSTRUCTION, UNSPECIFIED CHOLECYSTITIS ACUITY: Primary | ICD-10-CM

## 2023-08-08 PROCEDURE — 99214 OFFICE O/P EST MOD 30 MIN: CPT | Performed by: SURGERY

## 2023-08-14 ENCOUNTER — OFFICE VISIT (OUTPATIENT)
Facility: LOCATION | Age: 23
End: 2023-08-14
Payer: COMMERCIAL

## 2023-08-14 DIAGNOSIS — H69.93 UNSPECIFIED EUSTACHIAN TUBE DISORDER, BILATERAL: Primary | ICD-10-CM

## 2023-08-14 DIAGNOSIS — H93.293 ABNORMAL AUDITORY PERCEPTION OF BOTH EARS: Primary | ICD-10-CM

## 2023-08-14 PROCEDURE — 99213 OFFICE O/P EST LOW 20 MIN: CPT | Performed by: OTOLARYNGOLOGY

## 2023-08-14 PROCEDURE — 92567 TYMPANOMETRY: CPT | Performed by: AUDIOLOGIST

## 2023-08-16 ENCOUNTER — TELEPHONE (OUTPATIENT)
Facility: LOCATION | Age: 23
End: 2023-08-16

## 2023-08-16 DIAGNOSIS — K80.10 CALCULUS OF GALLBLADDER WITH CHOLECYSTITIS WITHOUT BILIARY OBSTRUCTION, UNSPECIFIED CHOLECYSTITIS ACUITY: Primary | ICD-10-CM

## 2023-08-18 ENCOUNTER — TELEPHONE (OUTPATIENT)
Facility: LOCATION | Age: 23
End: 2023-08-18

## 2023-08-31 ENCOUNTER — OFFICE VISIT (OUTPATIENT)
Facility: LOCATION | Age: 23
End: 2023-08-31
Payer: COMMERCIAL

## 2023-08-31 DIAGNOSIS — H69.93 UNSPECIFIED EUSTACHIAN TUBE DISORDER, BILATERAL: Primary | ICD-10-CM

## 2023-09-05 ENCOUNTER — OFFICE VISIT (OUTPATIENT)
Facility: LOCATION | Age: 23
End: 2023-09-05
Payer: COMMERCIAL

## 2023-09-05 DIAGNOSIS — H72.91 PERFORATION OF RIGHT TYMPANIC MEMBRANE: ICD-10-CM

## 2023-09-05 DIAGNOSIS — H69.93 UNSPECIFIED EUSTACHIAN TUBE DISORDER, BILATERAL: Primary | ICD-10-CM

## 2023-09-05 DIAGNOSIS — H93.293 ABNORMAL AUDITORY PERCEPTION, BILATERAL: Primary | ICD-10-CM

## 2023-09-05 PROCEDURE — 92553 AUDIOMETRY AIR & BONE: CPT | Performed by: AUDIOLOGIST

## 2023-09-05 PROCEDURE — 92567 TYMPANOMETRY: CPT | Performed by: AUDIOLOGIST

## 2023-09-05 NOTE — PROGRESS NOTES
Mel Croft was seen for an audiometric evaluation and tympanogram today. Referred back to physician.     Sushma Michaud

## 2023-09-12 ENCOUNTER — HOSPITAL ENCOUNTER (OUTPATIENT)
Dept: GENERAL RADIOLOGY | Facility: HOSPITAL | Age: 23
Discharge: HOME | End: 2023-09-12
Attending: INTERNAL MEDICINE
Payer: COMMERCIAL

## 2023-09-12 DIAGNOSIS — T19.2XXA: ICD-10-CM

## 2023-09-12 PROCEDURE — 74018 RADEX ABDOMEN 1 VIEW: CPT | Performed by: INTERNAL MEDICINE

## 2023-09-13 ENCOUNTER — TELEPHONE (OUTPATIENT)
Facility: LOCATION | Age: 23
End: 2023-09-13

## 2023-09-13 DIAGNOSIS — K80.12 CALCULUS OF GALLBLADDER WITH ACUTE ON CHRONIC CHOLECYSTITIS WITHOUT OBSTRUCTION: Primary | ICD-10-CM

## 2023-09-20 ENCOUNTER — OFFICE VISIT (OUTPATIENT)
Facility: LOCATION | Age: 23
End: 2023-09-20
Payer: COMMERCIAL

## 2023-09-20 DIAGNOSIS — H69.93 UNSPECIFIED EUSTACHIAN TUBE DISORDER, BILATERAL: Primary | ICD-10-CM

## 2023-09-20 DIAGNOSIS — H90.42 SENSORINEURAL HEARING LOSS (SNHL) OF LEFT EAR WITH UNRESTRICTED HEARING OF RIGHT EAR: Primary | ICD-10-CM

## 2023-09-20 DIAGNOSIS — H91.93 AUDITORY IMPAIRMENT, BILATERAL: ICD-10-CM

## 2023-09-20 PROCEDURE — 92557 COMPREHENSIVE HEARING TEST: CPT | Performed by: AUDIOLOGIST

## 2023-09-20 PROCEDURE — 92567 TYMPANOMETRY: CPT | Performed by: AUDIOLOGIST

## 2023-09-20 PROCEDURE — 99213 OFFICE O/P EST LOW 20 MIN: CPT | Performed by: OTOLARYNGOLOGY

## 2023-10-25 ENCOUNTER — OFFICE VISIT (OUTPATIENT)
Dept: FAMILY MEDICINE CLINIC | Facility: CLINIC | Age: 23
End: 2023-10-25

## 2023-10-25 VITALS
SYSTOLIC BLOOD PRESSURE: 112 MMHG | HEART RATE: 92 BPM | DIASTOLIC BLOOD PRESSURE: 60 MMHG | WEIGHT: 144 LBS | RESPIRATION RATE: 12 BRPM | HEIGHT: 68 IN | BODY MASS INDEX: 21.82 KG/M2

## 2023-10-25 DIAGNOSIS — G89.29 CHRONIC TMJ PAIN: Primary | ICD-10-CM

## 2023-10-25 DIAGNOSIS — M26.629 CHRONIC TMJ PAIN: Primary | ICD-10-CM

## 2023-10-25 PROBLEM — S03.00XA DISLOCATION OF JAW: Status: ACTIVE | Noted: 2023-10-25

## 2023-10-25 PROCEDURE — 3074F SYST BP LT 130 MM HG: CPT | Performed by: STUDENT IN AN ORGANIZED HEALTH CARE EDUCATION/TRAINING PROGRAM

## 2023-10-25 PROCEDURE — 3078F DIAST BP <80 MM HG: CPT | Performed by: STUDENT IN AN ORGANIZED HEALTH CARE EDUCATION/TRAINING PROGRAM

## 2023-10-25 PROCEDURE — 99213 OFFICE O/P EST LOW 20 MIN: CPT | Performed by: STUDENT IN AN ORGANIZED HEALTH CARE EDUCATION/TRAINING PROGRAM

## 2023-10-25 PROCEDURE — 3008F BODY MASS INDEX DOCD: CPT | Performed by: STUDENT IN AN ORGANIZED HEALTH CARE EDUCATION/TRAINING PROGRAM

## 2023-11-06 ENCOUNTER — OFFICE VISIT (OUTPATIENT)
Facility: LOCATION | Age: 23
End: 2023-11-06
Payer: COMMERCIAL

## 2023-11-06 DIAGNOSIS — H69.93 UNSPECIFIED EUSTACHIAN TUBE DISORDER, BILATERAL: Primary | ICD-10-CM

## 2023-11-06 DIAGNOSIS — H93.293 ABNORMAL AUDITORY PERCEPTION OF BOTH EARS: Primary | ICD-10-CM

## 2023-11-06 PROCEDURE — 99213 OFFICE O/P EST LOW 20 MIN: CPT | Performed by: OTOLARYNGOLOGY

## 2023-11-06 RX ORDER — FLUTICASONE PROPIONATE 50 MCG
1 SPRAY, SUSPENSION (ML) NASAL 2 TIMES DAILY
Qty: 16 G | Refills: 3 | Status: SHIPPED | OUTPATIENT
Start: 2023-11-06

## 2023-11-06 NOTE — PROGRESS NOTES
Nat Womackjorge was seen for an audiometric evaluation and tympanogram today. Referred back to physician.     Onel Dubois MS, CCC-A

## 2023-11-22 ENCOUNTER — TELEPHONE (OUTPATIENT)
Dept: PHYSICAL THERAPY | Facility: HOSPITAL | Age: 23
End: 2023-11-22

## 2023-11-29 ENCOUNTER — OFFICE VISIT (OUTPATIENT)
Dept: PHYSICAL THERAPY | Age: 23
End: 2023-11-29
Attending: STUDENT IN AN ORGANIZED HEALTH CARE EDUCATION/TRAINING PROGRAM
Payer: COMMERCIAL

## 2023-11-29 DIAGNOSIS — G89.29 CHRONIC TMJ PAIN: Primary | ICD-10-CM

## 2023-11-29 DIAGNOSIS — M26.629 CHRONIC TMJ PAIN: Primary | ICD-10-CM

## 2023-11-29 PROCEDURE — 97161 PT EVAL LOW COMPLEX 20 MIN: CPT

## 2023-11-29 PROCEDURE — 97110 THERAPEUTIC EXERCISES: CPT

## 2023-12-04 ENCOUNTER — OFFICE VISIT (OUTPATIENT)
Dept: PHYSICAL THERAPY | Age: 23
End: 2023-12-04
Attending: STUDENT IN AN ORGANIZED HEALTH CARE EDUCATION/TRAINING PROGRAM
Payer: COMMERCIAL

## 2023-12-04 PROCEDURE — 97110 THERAPEUTIC EXERCISES: CPT

## 2023-12-04 PROCEDURE — 97140 MANUAL THERAPY 1/> REGIONS: CPT

## 2023-12-04 NOTE — PROGRESS NOTES
Diagnosis:   Chronic TMJ pain (M26.629,G89.29)        Referring Provider: Brent Martino  Date of Evaluation:    11/29/2023    Precautions:  None Next MD visit:   none scheduled  Date of Surgery: n/a   Insurance Primary/Secondary: [employer] / N/A     # Auth Visits: 8            Subjective: Pt reports that she has been feeling less jaw pain since the eval. Been working on postural awareness, but notices fatigue in scapular muscles. Pain: 0/10 at rest, average pain since eval 4-5/10 upon waking in the morning      Objective:   Controlled opening jaw depression: 36 mm   Jaw depression: 40 mm    Assessment: Pt seen for first follow up since initial evaluation. She thinks the jaw pain is starting to improve with HEP and has been doing exercises as prescribed. Pt would benefit from continued skilled intervention to improve posture, TMJ mechanics and continue to assess contributions to headaches. Goals: (to be met in 8 visits) - progressing 12/4/2023  - Pt will reduce headache frequency to 1x or less per week to show improvements in postural awareness and cervical tension.   - Pt will report morning jaw soreness lasting for 30 min or less to show improvement in muscle tension.  - Pt will have full TMJ AROM without pain to improve mechanics and jaw function.   - Pt will be independent with comprehensive HEP to maintain gains made in therapy. Plan: Continue per plan of care. Next session: continue with postural awareness exercises, assess response to this session, progress controlled opening   Date: 12/4/2023  TX#: 2/8 Date:                 TX#: 3/ Date:                 TX#: 4/ Date:                 TX#: 5/ Date:    Tx#: 6/   Therex: 30 min  Chest stretch in corner, 2x30 sec  Rows with red band, 2x10  Chin tucks, 10 reps with 2 sec hold  Controlled opening, 10 reps  Back extension over chair, 10 reps  Supine on half foam roll bilateral shoulder flexion, 2x10 reps  Supine on half foam roll alternating shoulder flexion 10x R/L  Supine on half foam roll diagonal pulls with yellow band, 10x R/L each   Jaw isometrics: depression, retraction, R/L lateral deviation, 5 reps each way  Pt education:  updated HEP to add chest stretch, monitor headaches and look for contributing causes. Manual 10 min  STM to bilateral sternocleidomastoids and masseter                     HEP:  Access Code: [email or web address]  URL: [employer]/  Date: 12/04/2023  Prepared by: Nicolas Love    Program Notes  Heat on jaw- up to 10 min in the AM especially if it feels tight    Exercises  - Seated Scapular Retraction  - 3-4 x daily - 7 x weekly - 1 sets - 5-10 reps  - Seated Cervical Retraction  - 4 x daily - 7 x weekly - 1 sets - 5-10 reps  - Controlled Jaw Opening with Tongue on Roof of Mouth  - 1-2 x daily - 7 x weekly - 2 sets - 10 reps  - External Masseter Mobilization  - 1-2 x daily - 7 x weekly - 1 sets - 1 reps - 1-2 min hold  - Corner Pec Major Stretch  - 1 x daily - 7 x weekly - 3 sets - 1 reps - 30 hold    Patient Education  - Office Posture    Charges:  Therex 2 Manual 1       Total Timed Treatment: 40 min  Total Treatment Time: 40 min

## 2023-12-06 ENCOUNTER — OFFICE VISIT (OUTPATIENT)
Dept: PHYSICAL THERAPY | Age: 23
End: 2023-12-06
Attending: STUDENT IN AN ORGANIZED HEALTH CARE EDUCATION/TRAINING PROGRAM
Payer: COMMERCIAL

## 2023-12-06 PROCEDURE — 97140 MANUAL THERAPY 1/> REGIONS: CPT

## 2023-12-06 PROCEDURE — 97110 THERAPEUTIC EXERCISES: CPT

## 2023-12-06 NOTE — PROGRESS NOTES
Diagnosis:   Chronic TMJ pain (M26.629,G89.29)        Referring Provider: Syed Hays  Date of Evaluation:    11/29/2023    Precautions:  None Next MD visit:   none scheduled  Date of Surgery: n/a   Insurance Primary/Secondary: [employer] / N/A     # Auth Visits: 8            Subjective: Pt reports feeling a little more sore this morning in the jaw upon waking. Took about 10-15 min to resolve after massage to [employer]. She has not experienced a headache since last session. She does feel overall more sore in shoulders, upper back and legs. She thinks this may be due to watching multiple dogs this week and walking more than normal.   Pain: 3-4/10    Objective:      Jaw depression AROM: 43 mm, tension in masseter     Thoracic spine mobility  - Increased tenderness on R transverse process of T4-T5 with unilateral PA  - Grossly hypomobile throughout thoracic spine with PA's  - Increased prominence of spinous processes throughout thoracic spine    Assessment: Pt has improved AROM jaw depression to 43 mm from last session at 40 mm. Time to relieve jaw soreness in the morning has also reduced from 30 min to 10-15min with use of masseter massage. Pt subjectively reports increased thoracic back pain at today's session. Upon examination, pt's thoracic spine is grossly hypomobile with increased tenderness to T4/5 transverse processes. Continue per plan of care to address postural awareness and AROM of the jaw to achieve functional goals. Goals: (to be met in 8 visits) - progressing 12/6/2023  - Pt will reduce headache frequency to 1x or less per week to show improvements in postural awareness and cervical tension.   - Pt will report morning jaw soreness lasting for 30 min or less to show improvement in muscle tension.  - Pt will have full TMJ AROM without pain to improve mechanics and jaw function.   - Pt will be independent with comprehensive HEP to maintain gains made in therapy. Plan: Continue per plan of care.  Next session: assess response to new HEP, progress controlled opening if able  Date: 12/4/2023  TX#: 2/8 Date: 12/6/2023            TX#: 3/8 Date:                 TX#: 4/ Date:                 TX#: 5/ Date: Tx#: 6/   Therex: 30 min  Chest stretch in corner, 2x30 sec  Rows with red band, 2x10  Chin tucks, 10 reps with 2 sec hold  Controlled opening, 10 reps  Back extension over chair, 10 reps  Supine on half foam roll bilateral shoulder flexion, 2x10 reps  Supine on half foam roll alternating shoulder flexion 10x R/L  Supine on half foam roll diagonal pulls with yellow band, 10x R/L each   Jaw isometrics: depression, retraction, R/L lateral deviation, 5 reps each way  Pt education:  updated HEP to add chest stretch, monitor headaches and look for contributing causes. Therex: 25 min  Open book stretch, 10x R/L  Thoracic extensions over chair, 2x5, hands behind head  Chin tucks in supine, 10 reps, 2 sec hold  Rows with red band, 2x10  Supine on half foam roll alternating shoulder flexion 10x R/L  Supine on half foam roll diagonal pulls with yellow band, 10x R/L each    Supine on half roll, yellow band pull aparts, 10x  Child's pose, 3x30 sec *increased tightness felt in back  Thread the needle, 3 reps R/L, *tightness in R shoulder  Pt education: updated HEP, using ice as needed for back pain, chin tucks in supine to improve quality of movement        Manual 10 min  STM to bilateral sternocleidomastoids and masseter Manual: 15 min  Thoracic spine assessment above in objective  Skin lock PA's,gr III, 2x10  Transverse rotational mobilizations, gr III, 2x10 R/L  Unilateral PA's on either side of thoracic spine, gr IV, 2x10  STM to cervical spine, 5 min, focused on erector spinae muscles    Traction: 5 reps, 5 sec holds, gr III       Ice for 10 min              HEP:  Access Code: [email or web address]  URL: [email or web address]  Date: 12/06/2023  Prepared by: Patricia Crawford    Program Notes  Heat on jaw- up to 10 min in the AM especially if it feels tight    Exercises  - Seated Scapular Retraction  - 3-4 x daily - 7 x weekly - 1 sets - 5-10 reps  - Seated Cervical Retraction  - 4 x daily - 7 x weekly - 1 sets - 5-10 reps  - Controlled Jaw Opening with Tongue on Roof of Mouth  - 1-2 x daily - 7 x weekly - 2 sets - 10 reps  - External Masseter Mobilization  - 1-2 x daily - 7 x weekly - 1 sets - 1 reps - 1-2 min hold  - Corner Pec Major Stretch  - 1 x daily - 7 x weekly - 3 sets - 1 reps - 30 hold  - Sidelying Thoracic Rotation with Open Book  - 1 x daily - 7 x weekly - 1-2 sets - 10 reps  - Seated Thoracic Lumbar Extension with Pectoralis Stretch  - 1-2 x daily - 7 x weekly - 5 reps - 5 sec hold    Patient Education  - Office Posture    Charges:  Therex 2 Manual 1       Total Timed Treatment: 40 min  Total Treatment Time: 50 min

## 2023-12-11 ENCOUNTER — OFFICE VISIT (OUTPATIENT)
Dept: PHYSICAL THERAPY | Age: 23
End: 2023-12-11
Attending: STUDENT IN AN ORGANIZED HEALTH CARE EDUCATION/TRAINING PROGRAM
Payer: COMMERCIAL

## 2023-12-11 PROCEDURE — 97110 THERAPEUTIC EXERCISES: CPT

## 2023-12-11 PROCEDURE — 97140 MANUAL THERAPY 1/> REGIONS: CPT

## 2023-12-11 NOTE — PROGRESS NOTES
Diagnosis:   Chronic TMJ pain (M26.629,G89.29)        Referring Provider: Brenda Hollingsworth  Date of Evaluation:    11/29/2023    Precautions:  None Next MD visit:   none scheduled  Date of Surgery: n/a   Insurance Primary/Secondary: [employer] / N/A     # Auth Visits: 8            Subjective: Pt reports Friday just soreness in her back. Saturday woke up in a lot of jaw pain. She states still sore to the touch. She states pain was 4-5/10. It was tender to the touch all day. She woke up drenched in sweat and had very bad dreams. She states it has resolved to baseline. She states shoulders and base of her neck hurting while doing her stretches. Pain: 3-4/10    Objective:  Cervical AROM:   WFL except L rotation *tension on L side neck/ shoulder. Shoulder retraction: WFL   Shoulder protraction: *tension on L      Jaw depression AROM: 46 mm, tension in masseter - pre treatment      48 mm - post treatment         Assessment: Pt improved opening AROM to 48mm during therapy session. Within normal limits. She has some cerivcal/upper trap strain on the L likely 2/2 to walking dogs (leash held in LUE per subjective). However, regressing HEP postural activities temporarily to reduce possible contributions. Focused session on buccinator and closing strain from weekend clenching/grinding. STM , passive stretching and isometric opening tolerated well. HEP updated    Goals: (to be met in 8 visits) - progressing 12/11/2023   - Pt will reduce headache frequency to 1x or less per week to show improvements in postural awareness and cervical tension.   - Pt will report morning jaw soreness lasting for 30 min or less to show improvement in muscle tension.  - Pt will have full TMJ AROM without pain to improve mechanics and jaw function.   - Pt will be independent with comprehensive HEP to maintain gains made in therapy. Plan: Continue per plan of care.  Next session: assess response to new HEP, progress controlled opening if able  Date: 12/4/2023  TX#: 2/8 Date: 12/6/2023            TX#: 3/8 Date:  12/11/2023                TX#: 4/8  Date:                 TX#: 5/ Date: Tx#: 6/   Therex: 30 min  Chest stretch in corner, 2x30 sec  Rows with red band, 2x10  Chin tucks, 10 reps with 2 sec hold  Controlled opening, 10 reps  Back extension over chair, 10 reps  Supine on half foam roll bilateral shoulder flexion, 2x10 reps  Supine on half foam roll alternating shoulder flexion 10x R/L  Supine on half foam roll diagonal pulls with yellow band, 10x R/L each   Jaw isometrics: depression, retraction, R/L lateral deviation, 5 reps each way  Pt education:  updated HEP to add chest stretch, monitor headaches and look for contributing causes.   Therex: 25 min  Open book stretch, 10x R/L  Thoracic extensions over chair, 2x5, hands behind head  Chin tucks in supine, 10 reps, 2 sec hold  Rows with red band, 2x10  Supine on half foam roll alternating shoulder flexion 10x R/L  Supine on half foam roll diagonal pulls with yellow band, 10x R/L each    Supine on half roll, yellow band pull aparts, 10x  Child's pose, 3x30 sec *increased tightness felt in back  Thread the needle, 3 reps R/L, *tightness in R shoulder  Pt education: updated HEP, using ice as needed for back pain, chin tucks in supine to improve quality of movement   Therex: 28 min  Isometric resistance- manually for deviation 5 sec hold 5 reps L only x 2 sets   Isometric resistance manually for opening 3 sec hold 5 reps  x 2 sets R/L   Supine controlled opening 10 reps   Supine active opening stretch with self assist 5 sec hold 5 reps x 2 sets   Supine chin tuck 10 reps   Band scapular row green 10 reps x 2 sets- TCs for scapular retraction   Open book 10 reps R/L   Further postural exercises deferred   Assessment objective above 5 min   Pt education: HEP updates, timing and what to feel/ not to feel; headspace chay for before head, discussed sleep hygiene, discussed postural recommendations- movement/changing positions rather than striving for \"perfect posture\" all the time         Manual 10 min  STM to bilateral sternocleidomastoids and masseter Manual: 15 min  Thoracic spine assessment above in objective  Skin lock PA's,gr III, 2x10  Transverse rotational mobilizations, gr III, 2x10 R/L  Unilateral PA's on either side of thoracic spine, gr IV, 2x10  STM to cervical spine, 5 min, focused on erector spinae muscles    Traction: 5 reps, 5 sec holds, gr III Manual: 10 min  STM to buccinator, temporalis, pterygoids 10 min t/o therapy session           Ice for 10 min              HEP:Access Code: [email or web address]  URL: [email or web address]  Date: 12/11/2023  Prepared by: Todd Cruz    Program Notes  Heat on jaw- up to 10 min in the AM especially if it feels tight    Exercises  - External Masseter Mobilization  - 1-2 x daily - 7 x weekly - 1 sets - 1 reps - 1-2 min hold  - Sidelying Thoracic Rotation with Open Book  - 1 x daily - 7 x weekly - 1-2 sets - 10 reps  - Supine Chin Tuck  - 1 x daily - 7 x weekly - 1-2 sets - 10 reps  - Controlled Jaw Opening with Tongue on Roof of Mouth  - 1-2 x daily - 7 x weekly - 2 sets - 10 reps  - Isometric Jaw Abduction  - 1 x daily - 7 x weekly - 1 sets - 10 reps - 5 sec hold    Patient Education  - Office Posture    Charges:  Therex 2 Manual 1       Total Timed Treatment: 38 min  Total Treatment Time: 38 min

## 2023-12-13 ENCOUNTER — OFFICE VISIT (OUTPATIENT)
Dept: PHYSICAL THERAPY | Age: 23
End: 2023-12-13
Attending: STUDENT IN AN ORGANIZED HEALTH CARE EDUCATION/TRAINING PROGRAM
Payer: COMMERCIAL

## 2023-12-13 PROCEDURE — 97110 THERAPEUTIC EXERCISES: CPT

## 2023-12-13 PROCEDURE — 97140 MANUAL THERAPY 1/> REGIONS: CPT

## 2023-12-13 NOTE — PROGRESS NOTES
Diagnosis:   Chronic TMJ pain (M26.629,G89.29)        Referring Provider: Manuel Cruz  Date of Evaluation:    11/29/2023    Precautions:  None Next MD visit:   none scheduled  Date of Surgery: n/a   Insurance Primary/Secondary: [employer] / N/A     # Auth Visits: 8            Subjective: Pt reports tenderness to touch on the R side - 2/10. However, that is it. A couple min of soreness when waking but otherwise good. Denies any HAs. Reports subjectively less tenderness post treatment   Pain: 2/10    Objective:  Palpation: internal palpation of medial and lateral pterygoid: negative for tenderness on R  Jaw depression AROM: 48 mm, tension in masseter - pre treatment      48 mm - post treatment     Assessment: Pt maintained AROM opening ROM from last therapy session and t/o therapy session. Negative for tenderness in medial and lateral pterygoid today. Buccinator  to touch on the R >L. She was able to complete isometrics for opening and lateral deviation absent of pain complaints. HEP reviewed. Goals: (to be met in 8 visits) - progressing 12/13/2023    - Pt will reduce headache frequency to 1x or less per week to show improvements in postural awareness and cervical tension.   - Pt will report morning jaw soreness lasting for 30 min or less to show improvement in muscle tension.  - Pt will have full TMJ AROM without pain to improve mechanics and jaw function.   - Pt will be independent with comprehensive HEP to maintain gains made in therapy. Plan: Continue per plan of care at reduced frequency of 1x/week. Pt verbalized understanding. Date: 12/4/2023  TX#: 2/8 Date: 12/6/2023            TX#: 3/8 Date:  12/11/2023                TX#: 4/8  Date:   12/13/2023               TX#: 5/8  Date:    Tx#: 6/   Therex: 30 min  Chest stretch in corner, 2x30 sec  Rows with red band, 2x10  Chin tucks, 10 reps with 2 sec hold  Controlled opening, 10 reps  Back extension over chair, 10 reps  Supine on half foam roll bilateral shoulder flexion, 2x10 reps  Supine on half foam roll alternating shoulder flexion 10x R/L  Supine on half foam roll diagonal pulls with yellow band, 10x R/L each   Jaw isometrics: depression, retraction, R/L lateral deviation, 5 reps each way  Pt education:  updated HEP to add chest stretch, monitor headaches and look for contributing causes.   Therex: 25 min  Open book stretch, 10x R/L  Thoracic extensions over chair, 2x5, hands behind head  Chin tucks in supine, 10 reps, 2 sec hold  Rows with red band, 2x10  Supine on half foam roll alternating shoulder flexion 10x R/L  Supine on half foam roll diagonal pulls with yellow band, 10x R/L each    Supine on half roll, yellow band pull aparts, 10x  Child's pose, 3x30 sec *increased tightness felt in back  Thread the needle, 3 reps R/L, *tightness in R shoulder  Pt education: updated HEP, using ice as needed for back pain, chin tucks in supine to improve quality of movement   Therex: 28 min  Isometric resistance- manually for deviation 5 sec hold 5 reps L only x 2 sets   Isometric resistance manually for opening 3 sec hold 5 reps  x 2 sets R/L   Supine controlled opening 10 reps   Supine active opening stretch with self assist 5 sec hold 5 reps x 2 sets   Supine chin tuck 10 reps   Band scapular row green 10 reps x 2 sets- TCs for scapular retraction   Open book 10 reps R/L   Further postural exercises deferred   Assessment objective above 5 min   Pt education: HEP updates, timing and what to feel/ not to feel; headspace chay for before head, discussed sleep hygiene, discussed postural recommendations- movement/changing positions rather than striving for \"perfect posture\" all the time     Therex: 23 min  Supine controlled opening 10 reps  x 2 sets   Isometric resistance- manually for deviation 5 sec hold 5 reps L only x 2 sets   Isometric resistance manually for opening 3 sec hold 5 reps  x 2 sets R/L   Supine active opening stretch with self assist 5 sec hold 5 reps x 2 sets   Supine chin tuck 10 reps   Open book 10 reps R/L   Band scapular row green 10 reps x 2 sets- TCs for scapular retraction   Pt education: plan of care updates, timing of exercises- PM before bed, recommendations for muscle release techniques, HEP reviewed, answered all questions/concerns         Manual 10 min  STM to bilateral sternocleidomastoids and masseter Manual: 15 min  Thoracic spine assessment above in objective  Skin lock PA's,gr III, 2x10  Transverse rotational mobilizations, gr III, 2x10 R/L  Unilateral PA's on either side of thoracic spine, gr IV, 2x10  STM to cervical spine, 5 min, focused on erector spinae muscles    Traction: 5 reps, 5 sec holds, gr III Manual: 10 min  STM to buccinator, temporalis, pterygoids 10 min t/o therapy session      Manual: 15 min  STM to buccinator, temporalis, pterygoids 15 min t/o therapy session   Medial pterygoid, lateral pterygoid    Lateral glide mandible gr IV 10 reps R/L      Ice for 10 min              HEP:Access Code: [email or web address]  URL: [email or web address]  Date: 12/11/2023  Prepared by: Shameka Smith    Program Notes  Heat on jaw- up to 10 min in the AM especially if it feels tight    Exercises  - External Masseter Mobilization  - 1-2 x daily - 7 x weekly - 1 sets - 1 reps - 1-2 min hold  - Sidelying Thoracic Rotation with Open Book  - 1 x daily - 7 x weekly - 1-2 sets - 10 reps  - Supine Chin Tuck  - 1 x daily - 7 x weekly - 1-2 sets - 10 reps  - Controlled Jaw Opening with Tongue on Roof of Mouth  - 1-2 x daily - 7 x weekly - 2 sets - 10 reps  - Isometric Jaw Abduction  - 1 x daily - 7 x weekly - 1 sets - 10 reps - 5 sec hold    Patient Education  - Office Posture    Charges:  Therex 2 Manual 1       Total Timed Treatment: 38 min  Total Treatment Time: 38 min

## 2023-12-18 ENCOUNTER — OFFICE VISIT (OUTPATIENT)
Dept: PHYSICAL THERAPY | Age: 23
End: 2023-12-18
Attending: STUDENT IN AN ORGANIZED HEALTH CARE EDUCATION/TRAINING PROGRAM
Payer: COMMERCIAL

## 2023-12-18 PROCEDURE — 97110 THERAPEUTIC EXERCISES: CPT

## 2023-12-18 PROCEDURE — 97140 MANUAL THERAPY 1/> REGIONS: CPT

## 2023-12-18 NOTE — PROGRESS NOTES
Diagnosis:   Chronic TMJ pain (M26.629,G89.29)        Referring Provider: Geovanna Samuel  Date of Evaluation:    11/29/2023    Precautions:  None Next MD visit:   none scheduled  Date of Surgery: n/a   Insurance Primary/Secondary: [employer] / N/A     # Auth Visits: 8            Subjective: Pt reports not wearing  for the first time last night. This morning was most soreness she has had. She states pain was 5/10. She states took 30 min to resolve. She did self massage. Currently pain 1/10. She felt ok after last therapy session and felt good until Thursday- on and off HA since this time. Insidious onset. HA is central forehead right behind eyes. She states feels like someone is squeezing brain. She is not having HA right now. She states longest bit of relief has been 3-4 hours. Pain: 1/10    Objective:  Palpation: internal palpation of medial and lateral pterygoid: negative for tenderness on R  Jaw depression AROM: 42 mm, tension in masseter - pre treatment      48 mm - post treatment     Assessment: Pt arrived with more tightness in opening AROM. However, improved back to levels of last therapy session post treatment and with less subjective tightness/symptoms. At this time we discussed the benefits of mouth guard and pt in agreement of consistent wear schedule. Goals: (to be met in 8 visits) - progressing 12/18/2023     - Pt will reduce headache frequency to 1x or less per week to show improvements in postural awareness and cervical tension.   - Pt will report morning jaw soreness lasting for 30 min or less to show improvement in muscle tension.  - Pt will have full TMJ AROM without pain to improve mechanics and jaw function.   - Pt will be independent with comprehensive HEP to maintain gains made in therapy. Plan: Continue per plan of care at reduced frequency of 1x/week. Pt verbalized understanding.   Date: 12/4/2023  TX#: 2/8 Date: 12/6/2023            TX#: 3/8 Date:  12/11/2023                TX#: 4/8 Date:   12/13/2023               TX#: 5/8  Date: 12/18/2023   Tx#: 6/8    Therex: 30 min  Chest stretch in corner, 2x30 sec  Rows with red band, 2x10  Chin tucks, 10 reps with 2 sec hold  Controlled opening, 10 reps  Back extension over chair, 10 reps  Supine on half foam roll bilateral shoulder flexion, 2x10 reps  Supine on half foam roll alternating shoulder flexion 10x R/L  Supine on half foam roll diagonal pulls with yellow band, 10x R/L each   Jaw isometrics: depression, retraction, R/L lateral deviation, 5 reps each way  Pt education:  updated HEP to add chest stretch, monitor headaches and look for contributing causes.   Therex: 25 min  Open book stretch, 10x R/L  Thoracic extensions over chair, 2x5, hands behind head  Chin tucks in supine, 10 reps, 2 sec hold  Rows with red band, 2x10  Supine on half foam roll alternating shoulder flexion 10x R/L  Supine on half foam roll diagonal pulls with yellow band, 10x R/L each    Supine on half roll, yellow band pull aparts, 10x  Child's pose, 3x30 sec *increased tightness felt in back  Thread the needle, 3 reps R/L, *tightness in R shoulder  Pt education: updated HEP, using ice as needed for back pain, chin tucks in supine to improve quality of movement   Therex: 28 min  Isometric resistance- manually for deviation 5 sec hold 5 reps L only x 2 sets   Isometric resistance manually for opening 3 sec hold 5 reps  x 2 sets R/L   Supine controlled opening 10 reps   Supine active opening stretch with self assist 5 sec hold 5 reps x 2 sets   Supine chin tuck 10 reps   Band scapular row green 10 reps x 2 sets- TCs for scapular retraction   Open book 10 reps R/L   Further postural exercises deferred   Assessment objective above 5 min   Pt education: HEP updates, timing and what to feel/ not to feel; headspace chay for before head, discussed sleep hygiene, discussed postural recommendations- movement/changing positions rather than striving for \"perfect posture\" all the time Therex: 23 min  Supine controlled opening 10 reps  x 2 sets   Isometric resistance- manually for deviation 5 sec hold 5 reps L only x 2 sets   Isometric resistance manually for opening 3 sec hold 5 reps  x 2 sets R/L   Supine active opening stretch with self assist 5 sec hold 5 reps x 2 sets   Supine chin tuck 10 reps   Open book 10 reps R/L   Band scapular row green 10 reps x 2 sets- TCs for scapular retraction   Pt education: plan of care updates, timing of exercises- PM before bed, recommendations for muscle release techniques, HEP reviewed, answered all questions/concerns      Therex: 15 min  Controlled opening 10 reps   Opening AROM FULL supine 10 reps   Opening 3 sec hold 10 reps x 2 sets   Chin tucks 10 sec hold 10 reps   Open book 10 reps R/L   Prone scapular retraction 10 reps   Open books 10 reps        Manual 10 min  STM to bilateral sternocleidomastoids and masseter Manual: 15 min  Thoracic spine assessment above in objective  Skin lock PA's,gr III, 2x10  Transverse rotational mobilizations, gr III, 2x10 R/L  Unilateral PA's on either side of thoracic spine, gr IV, 2x10  STM to cervical spine, 5 min, focused on erector spinae muscles    Traction: 5 reps, 5 sec holds, gr III Manual: 10 min  STM to buccinator, temporalis, pterygoids 10 min t/o therapy session      Manual: 15 min  STM to buccinator, temporalis, pterygoids 15 min t/o therapy session   Medial pterygoid, lateral pterygoid    Lateral glide mandible gr IV 10 reps R/L  Manual: 23 min  STM to buccinator, temporalis, pterygoids 15 min t/o therapy session   Medial pterygoid, lateral pterygoid  Lateral glide mandible gr IV 10 reps R/L   Trigger point release to suboccipitals 2 min   Supine isometrics manual resistance: lateral deviation R 3 sec hold 10 reps x 2 sets   Lateral deviation L 3 sec hold 10 reps x 2 sets     Ice for 10 min              HEP:Access Code: [email or web address]  URL: [email or web address]  Date: 12/11/2023  Prepared by: Leisa Lainez Neetu    Program Notes  Heat on jaw- up to 10 min in the AM especially if it feels tight    Exercises  - External Masseter Mobilization  - 1-2 x daily - 7 x weekly - 1 sets - 1 reps - 1-2 min hold  - Sidelying Thoracic Rotation with Open Book  - 1 x daily - 7 x weekly - 1-2 sets - 10 reps  - Supine Chin Tuck  - 1 x daily - 7 x weekly - 1-2 sets - 10 reps  - Controlled Jaw Opening with Tongue on Roof of Mouth  - 1-2 x daily - 7 x weekly - 2 sets - 10 reps  - Isometric Jaw Abduction  - 1 x daily - 7 x weekly - 1 sets - 10 reps - 5 sec hold    Patient Education  - Office Posture    Charges:  Therex 1 Manual 2       Total Timed Treatment: 38 min  Total Treatment Time: 38 min

## 2023-12-20 ENCOUNTER — APPOINTMENT (OUTPATIENT)
Dept: PHYSICAL THERAPY | Age: 23
End: 2023-12-20
Attending: STUDENT IN AN ORGANIZED HEALTH CARE EDUCATION/TRAINING PROGRAM
Payer: COMMERCIAL

## 2023-12-30 ENCOUNTER — OFFICE VISIT (OUTPATIENT)
Dept: FAMILY MEDICINE CLINIC | Facility: CLINIC | Age: 23
End: 2023-12-30
Payer: COMMERCIAL

## 2023-12-30 VITALS
SYSTOLIC BLOOD PRESSURE: 124 MMHG | DIASTOLIC BLOOD PRESSURE: 80 MMHG | BODY MASS INDEX: 21.98 KG/M2 | HEART RATE: 87 BPM | OXYGEN SATURATION: 98 % | HEIGHT: 68 IN | WEIGHT: 145 LBS | TEMPERATURE: 99 F | RESPIRATION RATE: 18 BRPM

## 2023-12-30 DIAGNOSIS — L03.032 PARONYCHIA OF TOE OF LEFT FOOT: Primary | ICD-10-CM

## 2023-12-30 PROCEDURE — 3008F BODY MASS INDEX DOCD: CPT | Performed by: PHYSICIAN ASSISTANT

## 2023-12-30 PROCEDURE — 3079F DIAST BP 80-89 MM HG: CPT | Performed by: PHYSICIAN ASSISTANT

## 2023-12-30 PROCEDURE — 3074F SYST BP LT 130 MM HG: CPT | Performed by: PHYSICIAN ASSISTANT

## 2023-12-30 PROCEDURE — 99213 OFFICE O/P EST LOW 20 MIN: CPT | Performed by: PHYSICIAN ASSISTANT

## 2023-12-30 RX ORDER — CEPHALEXIN 500 MG/1
500 CAPSULE ORAL 3 TIMES DAILY
Qty: 21 CAPSULE | Refills: 0 | Status: SHIPPED | OUTPATIENT
Start: 2023-12-30 | End: 2024-01-06

## 2024-01-12 ENCOUNTER — TELEMEDICINE (OUTPATIENT)
Dept: FAMILY MEDICINE CLINIC | Facility: CLINIC | Age: 24
End: 2024-01-12
Payer: COMMERCIAL

## 2024-01-12 DIAGNOSIS — L60.0 INGROWN TOENAIL: Primary | ICD-10-CM

## 2024-01-12 DIAGNOSIS — L03.032 PARONYCHIA OF TOE OF LEFT FOOT: ICD-10-CM

## 2024-01-12 PROCEDURE — 99213 OFFICE O/P EST LOW 20 MIN: CPT | Performed by: PHYSICIAN ASSISTANT

## 2024-01-12 RX ORDER — CEPHALEXIN 500 MG/1
500 CAPSULE ORAL 3 TIMES DAILY
Qty: 21 CAPSULE | Refills: 0 | Status: SHIPPED | OUTPATIENT
Start: 2024-01-12 | End: 2024-01-19

## 2024-01-12 NOTE — PATIENT INSTRUCTIONS
Avoid trimming toenails too short to help avoid ingrown toenails  Start with soaks 10-15 min with warm warm + Epsom salts, dry off foot, gently lift corners of the nail, then apply topical antibiotic ointment. Try to do this 2-3 times per day.  I sent a refill of the same oral antibiotic as that seemed to help.  If the toenail is worsening again, it may be a sign that you need to see a podiatrist.

## 2024-01-12 NOTE — PROGRESS NOTES
Telemedicine Visit     Virtual Video Check-In    Quita Gomes verbally consents to Video Check-In service on 1/12/24. Quita Gomes understands and accepts financial responsibility for any deductible, co-insurance and/or co-pays associated with this service. This visit is conducted using Telemedicine with live, interactive video and audio. Quita Gomes states they are currently in the [facility].     Chief Complaint: toe follow up    HPI:   Quita is here today for follow up paronychia. She was seen by [facility] on 12/30 and given rx for keflex. Noted swelling and drainage improved but redness never fully went away.  Pain did improve while on the antibiotic. The significant pain has improved for the most part. She does note that she may have trimmed her toenail too short.      Patient Active Problem List   Diagnosis    Depressive disorder, not elsewhere classified    Injury, self-inflicted    Social anxiety disorder    Moderate episode of recurrent major depressive disorder (HCC)    Generalized anxiety disorder    Borderline personality disorder (HCC)    Weight loss    Constipation    Generalized abdominal pain    Abdominal pain    Abdominal gas pain    Loss of weight    Abdominal pain, generalized    Ileitis    Intestinal ulcer    Dislocation of jaw       Current Outpatient Medications   Medication Sig Dispense Refill    mupirocin 2 % External Ointment Apply 1 Application topically 3 (three) times daily. 30 g 0    cephalexin 500 MG Oral Cap Take 1 capsule (500 mg total) by mouth 3 (three) times daily for 7 days. 21 capsule 0    escitalopram 10 MG Oral Tab Take 1 tablet (10 mg total) by mouth every evening. 30 tablet 2    mirtazapine (REMERON) 15 MG Oral Tab Take 1 tablet (15 mg total) by mouth nightly. 30 tablet 2    buPROPion  MG Oral Tablet 24 Hr Take 1 tablet (300 mg total) by mouth every morning. 30 tablet 2    hydrOXYzine 10 MG Oral Tab Take 1 tablet (10 mg total) by mouth 3 (three) times  daily as needed for Itching or Anxiety. 90 tablet 0    fluticasone propionate 50 MCG/ACT Nasal Suspension 1 spray by Nasal route 2 (two) times daily. 16 g 3    lubiprostone 24 MCG Oral Cap TAKE 1 CAPSULE BY MOUTH TWICE A  capsule 3    omeprazole 20 MG Oral Capsule Delayed Release TAKE 1 CAPSULE BY MOUTH EVERY DAY 30 MIN PRIOR TO BREAKFAST 90 capsule 3    ALPRAZOLAM 0.25 MG Oral Tab TAKE 1 TABLET BY MOUTH DAILY AS NEEDED FOR ANXIETY (Patient taking differently: as needed.) 20 tablet 0       Allergies  No Known Allergies    Physical exam  Physical Exam  Constitutional:       Appearance: Normal appearance.   Skin:     Comments: Erythematous medial aspect of left great toe, no distinct drainage noted.   Neurological:      Mental Status: She is alert.            Summary of topics discussed/ diagnosis:  1. Ingrown toenail    2. Paronychia of toe of left foot  Suspect that she has not had complete improvement as the toenail does appear to be ingrown. Try to soak the foot then gently pull back the corners of the nail to try to help. Will extend keflex additional 7 days as the medial aspect still quite erythematous. Apply mupirocin topically. If not improving from here, will need to see podiatry.       Please note that the following visit was completed using two-way, real-time interactive audio and/or video communication.  This has been done in good black to provide continuity of care in the best interest of the provider-patient relationship.There are limitations of this visit as no physical exam could be performed.  Every conscious effort was taken to allow for sufficient and adequate time.  This billing was spent on reviewing labs, medications, radiology tests and decision making.  Appropriate medical decision-making and tests are ordered as detailed in the plan of care above. Quita Gomes understands video evaluation is not a substitute for in person examination or emergency care. Patient advised to go to ER or  call 911 for worsening symptoms or acute distress.   JERSON Webb

## 2024-01-30 ENCOUNTER — OFFICE VISIT (OUTPATIENT)
Dept: PHYSICAL THERAPY | Age: 24
End: 2024-01-30
Attending: STUDENT IN AN ORGANIZED HEALTH CARE EDUCATION/TRAINING PROGRAM
Payer: COMMERCIAL

## 2024-01-30 PROCEDURE — 97110 THERAPEUTIC EXERCISES: CPT

## 2024-01-30 NOTE — PROGRESS NOTES
Diagnosis:   Chronic TMJ pain (M26.629,G89.29)        Referring Provider: Edenilson  Date of Evaluation:    11/29/2023    Precautions:  None Next MD visit:   none scheduled  Date of Surgery: n/a   Insurance Primary/Secondary: [employer] / N/A     # Auth Visits: 8           Discharge Summary  Pt has attended 6 visits in Physical Therapy.     Subjective: Pt states HAs are almost entirely gone. She had COVID is feeling residual fatigue still- 1 mo. She states  is helping a lot. Not often is she waking up with a lot of pain. Can not think of the last time she woke up in pain. Denies need for further skilled PT intervention.   Pain: 0/10    Objective:  Palpation: internal palpation of medial and lateral pterygoid: negative for tenderness on R    Controlled opening jaw depression: 38 mm    TMJ AROM Mobility Mobility (mm) End Range/Quality of mvmt   Depression (45mm) 48 mm    R lateral deviation (8mm) 8 mm     L lateral deviation (8mm) 8mm     Protrusion (6-8mm) 7 mm     Retrusion (3mm) 4 mm         Assessment: Pt arrived with more tightness in opening AROM. However, improved back to levels of last therapy session post treatment and with less subjective tightness/symptoms. At this time we discussed the benefits of mouth guard and pt in agreement of consistent wear schedule.     Goals: (to be met in 8 visits) - progressing 1/30/2024      - Pt will reduce headache frequency to 1x or less per week to show improvements in postural awareness and cervical tension.   - Pt will report morning jaw soreness lasting for 30 min or less to show improvement in muscle tension.  - Pt will have full TMJ AROM without pain to improve mechanics and jaw function.   - Pt will be independent with comprehensive [facility] to maintain gains made in therapy.     Plan: Discharge to [facility]  Patient/Family/Caregiver was advised of these findings, precautions, and treatment options and has agreed to actively participate in planning and for this course of  care.    Thank you for your referral. If you have any questions, please contact me at Dept: [phone]    Sincerely,  Electronically signed by therapist: Kayce De Anda, PT   Date: 12/4/2023  TX#: 2/8 Date: 12/6/2023            TX#: 3/8 Date:  12/11/2023                TX#: 4/8  Date:   12/13/2023               TX#: 5/8  Date: 12/18/2023   Tx#: 6/8 1/30/2024   Tx#: 7/8   Therex: 30 min  Chest stretch in corner, 2x30 sec  Rows with red band, 2x10  Chin tucks, 10 reps with 2 sec hold  Controlled opening, 10 reps  Back extension over chair, 10 reps  Supine on half foam roll bilateral shoulder flexion, 2x10 reps  Supine on half foam roll alternating shoulder flexion 10x R/L  Supine on half foam roll diagonal pulls with yellow band, 10x R/L each   Jaw isometrics: depression, retraction, R/L lateral deviation, 5 reps each way  Pt education:  updated HEP to add chest stretch, monitor headaches and look for contributing causes.  Therex: 25 min  Open book stretch, 10x R/L  Thoracic extensions over chair, 2x5, hands behind head  Chin tucks in supine, 10 reps, 2 sec hold  Rows with red band, 2x10  Supine on half foam roll alternating shoulder flexion 10x R/L  Supine on half foam roll diagonal pulls with yellow band, 10x R/L each    Supine on half roll, yellow band pull aparts, 10x  Child's pose, 3x30 sec *increased tightness felt in back  Thread the needle, 3 reps R/L, *tightness in R shoulder  Pt education: updated HEP, using ice as needed for back pain, chin tucks in supine to improve quality of movement   Therex: 28 min  Isometric resistance- manually for deviation 5 sec hold 5 reps L only x 2 sets   Isometric resistance manually for opening 3 sec hold 5 reps  x 2 sets R/L   Supine controlled opening 10 reps   Supine active opening stretch with self assist 5 sec hold 5 reps x 2 sets   Supine chin tuck 10 reps   Band scapular row green 10 reps x 2 sets- TCs for scapular retraction   Open book 10 reps R/L   Further  postural exercises deferred   Assessment objective above 5 min   Pt education: HEP updates, timing and what to feel/ not to feel; headspace chay for before head, discussed sleep hygiene, discussed postural recommendations- movement/changing positions rather than striving for \"perfect posture\" all the time     Therex: 23 min  Supine controlled opening 10 reps  x 2 sets   Isometric resistance- manually for deviation 5 sec hold 5 reps L only x 2 sets   Isometric resistance manually for opening 3 sec hold 5 reps  x 2 sets R/L   Supine active opening stretch with self assist 5 sec hold 5 reps x 2 sets   Supine chin tuck 10 reps   Open book 10 reps R/L   Band scapular row green 10 reps x 2 sets- TCs for scapular retraction   Pt education: plan of care updates, timing of exercises- PM before bed, recommendations for muscle release techniques, HEP reviewed, answered all questions/concerns      Therex: 15 min  Controlled opening 10 reps   Opening AROM FULL supine 10 reps   Opening 3 sec hold 10 reps x 2 sets   Chin tucks 10 sec hold 10 reps   Open book 10 reps R/L   Prone scapular retraction 10 reps   Open books 10 reps      Therex: 25 min  Objective assessment above.   Review of HEP and recommendations for home. Pt education and answered questions or concerns.     Updated subjective from gap in care.    Manual 10 min  STM to bilateral sternocleidomastoids and masseter Manual: 15 min  Thoracic spine assessment above in objective  Skin lock PA's,gr III, 2x10  Transverse rotational mobilizations, gr III, 2x10 R/L  Unilateral PA's on either side of thoracic spine, gr IV, 2x10  STM to cervical spine, 5 min, focused on erector spinae muscles    Traction: 5 reps, 5 sec holds, gr III Manual: 10 min  STM to buccinator, temporalis, pterygoids 10 min t/o therapy session      Manual: 15 min  STM to buccinator, temporalis, pterygoids 15 min t/o therapy session   Medial pterygoid, lateral pterygoid    Lateral glide mandible gr IV 10 reps  R/L  Manual: 23 min  STM to buccinator, temporalis, pterygoids 15 min t/o therapy session   Medial pterygoid, lateral pterygoid  Lateral glide mandible gr IV 10 reps R/L   Trigger point release to suboccipitals 2 min   Supine isometrics manual resistance: lateral deviation R 3 sec hold 10 reps x 2 sets   Lateral deviation L 3 sec hold 10 reps x 2 sets  -    Ice for 10 min                HEP:Access Code: [email or web address]  URL: [email or web address]  Date: 12/11/2023  Prepared by: Kayce De Anda    Program Notes  Heat on jaw- up to 10 min in the AM especially if it feels tight    Exercises  - External Masseter Mobilization  - 1-2 x daily - 7 x weekly - 1 sets - 1 reps - 1-2 min hold  - Sidelying Thoracic Rotation with Open Book  - 1 x daily - 7 x weekly - 1-2 sets - 10 reps  - Supine Chin Tuck  - 1 x daily - 7 x weekly - 1-2 sets - 10 reps  - Controlled Jaw Opening with Tongue on Roof of Mouth  - 1-2 x daily - 7 x weekly - 2 sets - 10 reps  - Isometric Jaw Abduction  - 1 x daily - 7 x weekly - 1 sets - 10 reps - 5 sec hold    Patient Education  - Office Posture  1/30/2024 emailed copy to pt for discharge    Charges: Therex 2       Total Timed Treatment: 25 min  Total Treatment Time: 25 min

## 2024-02-12 ENCOUNTER — OFFICE VISIT (OUTPATIENT)
Dept: PODIATRY CLINIC | Facility: CLINIC | Age: 24
End: 2024-02-12
Payer: COMMERCIAL

## 2024-02-12 VITALS — DIASTOLIC BLOOD PRESSURE: 62 MMHG | SYSTOLIC BLOOD PRESSURE: 116 MMHG

## 2024-02-12 DIAGNOSIS — L03.032 PARONYCHIA OF TOE OF LEFT FOOT: ICD-10-CM

## 2024-02-12 DIAGNOSIS — L60.0 ONYCHOCRYPTOSIS: Primary | ICD-10-CM

## 2024-02-12 RX ORDER — AMOXICILLIN AND CLAVULANATE POTASSIUM 875; 125 MG/1; MG/1
1 TABLET, FILM COATED ORAL 2 TIMES DAILY
Qty: 14 TABLET | Refills: 0 | Status: SHIPPED | OUTPATIENT
Start: 2024-02-12

## 2024-02-12 NOTE — PROGRESS NOTES
Per Dr. Mulligan verbal order, to draw up 5ml of 0.5% Marcaine for ingrown toenail procedure. Patient had left before obtaining post vitals.
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
## 2024-02-26 ENCOUNTER — OFFICE VISIT (OUTPATIENT)
Dept: PODIATRY CLINIC | Facility: CLINIC | Age: 24
End: 2024-02-26
Payer: COMMERCIAL

## 2024-02-26 DIAGNOSIS — Z98.890 STATUS POST NAIL SURGERY: Primary | ICD-10-CM

## 2024-02-26 PROCEDURE — 99212 OFFICE O/P EST SF 10 MIN: CPT | Performed by: PODIATRIST
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
## 2024-04-02 ENCOUNTER — TELEMEDICINE (OUTPATIENT)
Dept: FAMILY MEDICINE CLINIC | Facility: CLINIC | Age: 24
End: 2024-04-02
Payer: COMMERCIAL

## 2024-04-02 DIAGNOSIS — Z11.3 SCREEN FOR STD (SEXUALLY TRANSMITTED DISEASE): ICD-10-CM

## 2024-04-02 DIAGNOSIS — R10.11 RUQ PAIN: Primary | ICD-10-CM

## 2024-04-02 PROCEDURE — 99214 OFFICE O/P EST MOD 30 MIN: CPT | Performed by: PHYSICIAN ASSISTANT

## 2024-04-02 RX ORDER — GABAPENTIN 300 MG/1
300 CAPSULE ORAL NIGHTLY
Qty: 30 CAPSULE | Refills: 0 | Status: SHIPPED | OUTPATIENT
Start: 2024-04-02
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
## 2024-04-25 ENCOUNTER — OFFICE VISIT (OUTPATIENT)
Dept: OBGYN CLINIC | Facility: CLINIC | Age: 24
End: 2024-04-25
Payer: COMMERCIAL

## 2024-04-25 VITALS — SYSTOLIC BLOOD PRESSURE: 114 MMHG | DIASTOLIC BLOOD PRESSURE: 78 MMHG | WEIGHT: 166 LBS | BODY MASS INDEX: 25 KG/M2

## 2024-04-25 DIAGNOSIS — K59.09 CHRONIC CONSTIPATION: ICD-10-CM

## 2024-04-25 DIAGNOSIS — Z11.3 SCREEN FOR STD (SEXUALLY TRANSMITTED DISEASE): ICD-10-CM

## 2024-04-25 DIAGNOSIS — N94.11 INTROITAL DYSPAREUNIA: ICD-10-CM

## 2024-04-25 DIAGNOSIS — Z12.4 CERVICAL CANCER SCREENING: Primary | ICD-10-CM

## 2024-04-25 DIAGNOSIS — N81.10 PROLAPSE OF ANTERIOR VAGINAL WALL: ICD-10-CM

## 2024-04-25 PROCEDURE — 87591 N.GONORRHOEAE DNA AMP PROB: CPT | Performed by: NURSE PRACTITIONER

## 2024-04-25 PROCEDURE — 88175 CYTOPATH C/V AUTO FLUID REDO: CPT | Performed by: NURSE PRACTITIONER

## 2024-04-25 PROCEDURE — 81514 NFCT DS BV&VAGINITIS DNA ALG: CPT | Performed by: NURSE PRACTITIONER

## 2024-04-25 PROCEDURE — 87491 CHLMYD TRACH DNA AMP PROBE: CPT | Performed by: NURSE PRACTITIONER

## 2024-04-25 PROCEDURE — 99459 PELVIC EXAMINATION: CPT | Performed by: NURSE PRACTITIONER

## 2024-04-25 PROCEDURE — 99203 OFFICE O/P NEW LOW 30 MIN: CPT | Performed by: NURSE PRACTITIONER

## 2024-04-25 RX ORDER — MONTELUKAST SODIUM 10 MG/1
10 TABLET ORAL DAILY
COMMUNITY
Start: 2024-03-20

## 2024-04-25 RX ORDER — NORETHINDRONE ACETATE AND ETHINYL ESTRADIOL AND FERROUS FUMARATE 1MG-20(21)
KIT ORAL
COMMUNITY
Start: 2024-01-22
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
## 2024-04-26 LAB
BV BACTERIA DNA VAG QL NAA+PROBE: POSITIVE
C GLABRATA DNA VAG QL NAA+PROBE: NEGATIVE
C KRUSEI DNA VAG QL NAA+PROBE: NEGATIVE
C TRACH DNA SPEC QL NAA+PROBE: NEGATIVE
CANDIDA DNA VAG QL NAA+PROBE: POSITIVE
N GONORRHOEA DNA SPEC QL NAA+PROBE: NEGATIVE
T VAGINALIS DNA VAG QL NAA+PROBE: NEGATIVE

## 2024-05-01 LAB
.: NORMAL
.: NORMAL

## 2024-05-17 ENCOUNTER — TELEPHONE (OUTPATIENT)
Dept: PHYSICAL THERAPY | Facility: HOSPITAL | Age: 24
End: 2024-05-17

## 2024-05-21 ENCOUNTER — OFFICE VISIT (OUTPATIENT)
Dept: PHYSICAL THERAPY | Age: 24
End: 2024-05-21
Attending: NURSE PRACTITIONER

## 2024-05-21 DIAGNOSIS — N81.10 PROLAPSE OF ANTERIOR VAGINAL WALL: Primary | ICD-10-CM

## 2024-05-21 DIAGNOSIS — K59.09 CHRONIC CONSTIPATION: ICD-10-CM

## 2024-05-21 PROCEDURE — 97163 PT EVAL HIGH COMPLEX 45 MIN: CPT | Performed by: PHYSICAL THERAPIST
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
## 2024-05-29 RX ORDER — GABAPENTIN 300 MG/1
300 CAPSULE ORAL NIGHTLY
Qty: 90 CAPSULE | Refills: 0 | Status: SHIPPED | OUTPATIENT
Start: 2024-05-29

## 2024-05-29 NOTE — TELEPHONE ENCOUNTER
Requested Prescriptions     Pending Prescriptions Disp Refills    gabapentin 300 MG Oral Cap 30 capsule 0     Sig: Take 1 capsule (300 mg total) by mouth nightly.     LOV 10/25/2023 telemedicine 4/2/2024    Patient was asked to follow-up in: Follow-up not documented in note    Appointment scheduled: Visit date not found     Medication refilled per protocol.    Routed to Corby Ritter PAC for review.    Last refill was on 4/2/2025 with 30 capsule and 0 refills.

## 2024-05-30 RX ORDER — GABAPENTIN 300 MG/1
300 CAPSULE ORAL NIGHTLY
Qty: 30 CAPSULE | Refills: 0 | OUTPATIENT
Start: 2024-05-30

## 2024-05-31 ENCOUNTER — OFFICE VISIT (OUTPATIENT)
Dept: PHYSICAL THERAPY | Age: 24
End: 2024-05-31
Attending: NURSE PRACTITIONER

## 2024-05-31 PROCEDURE — 97110 THERAPEUTIC EXERCISES: CPT | Performed by: PHYSICAL THERAPIST

## 2024-05-31 PROCEDURE — 97140 MANUAL THERAPY 1/> REGIONS: CPT | Performed by: PHYSICAL THERAPIST

## 2024-05-31 NOTE — PROGRESS NOTES
Diagnosis:   Prolapse of anterior vaginal wall (N81.10)  Chronic constipation (K59.09)            Referring Provider: Bing Jara  Date of Evaluation:     5/21/2024    Precautions:  None Next MD visit:   none scheduled  Date of Surgery: n/a   Insurance Primary/Secondary: [employer] / N/A     # Auth Visits: n/a            Subjective: For the past 3-4 days, she has been having BMs everyday which is good, she is still taking the laxatives. Also reports that she still feels the air bubble in the vaginal area.     Pain: 0/10      Objective:   Palpation:   Pubococcygeus/Pubovaginalis B severe restriction  Iliococcygeus B moderate restriction  Coccygeus B moderate restriction  Obturator Internus B WNL      Assessment:  Initiated therapeutic exercises today. Treatment time includes explanation of intended effect of each intervention, instruction before and during exercises, including cues for proper form and performance, assessment of patient's response to each activity and education to improve performance of active intervention and good comprehension of treatment plan to improve patient participation and compliance. Intervention adjusted to patient's tolerance throughout session duration.       Goals:   Goals: (to be met in 8 visits)  Patient will have increased awareness for TA/PF isolation to promote contraction and relaxation, and facilitate activation for normal bowel/bladde voiding patterns   Patient will demonstrate decreased muscles tightness, decreased tenderness, with improve soft tissues mobility to allow PF muscles relaxation for ease of urination/defecation with ease for initiating urine and decreased straining .    Patient to demonstrate improve Core muscles strength to good and PF muscles strength to 4/10/10//10 using the Laycock scale to decrease prolapse symptoms, decrease \"air bubble\" sensation in the vagina and improve pelvic stability.  Patient will be able to do a diet/bowel log to determine  eating/drinking patterns, and improve bowel movement to 1x/week, with improved [place] stool to Type 3-4 consistently.  Patient will verbalize increase in knowledge and understanding of bladder/ bowel/ pelvic mechanics, report regular compliance of established HEP, including diaphragmatic breathing,  to manage symptoms,  and future exacerbations.     Plan: Continue PT as per established POC.  Date: 5/31/2024  TX#: 2/8 Date:                 TX#: 3/ Date:                 TX#: 4/ Date:                 TX#: 5/ Date:   Tx#: 6/   Nu-step, L3 , 5 mins  Calf stretches on incline 2x 30 secs  Adductor stretches 2x 30 secs  Hamstrings stretches with strap 2x 30 secs  Piriformis stretches 2x 30 secs (fig 4 no lift)  Modified Brian stretches 2x 30 secs  TA brace x 10 with 5 secs  TA brace with PFM with ball squeeze x 10 with 5 secs  TA brace with TB with PFM x 10 with 5 secs  SL clams x 10   Thera -ex 32       Man therapy- 8 mins  STM for pelvic floor muscles, isolated PF exercises, sustained and rapid contractions  Isolated TA exercises coordinated with breathing                     HEP: Access Code: [email or web address]  URL: [email or web address]  Date: 05/31/2024  Prepared by: Gwendolyn Carrillo    Exercises  - Supine Butterfly Groin Stretch  - 1 x daily - 7 x weekly - 1 sets - 3 reps - 30 hold  - Supine Hamstring Stretch with Strap  - 2 x daily - 7 x weekly - 1 sets - 3 reps - 30 hold  - Supine Figure 4 Piriformis Stretch  - 1 x daily - 7 x weekly - 1-2 sets - 2 reps - 30 hold  - Modified Brian Stretch  - 1 x daily - 7 x weekly - 1 sets - 3 reps - 30 hold  - Supine Transversus Abdominis Bracing with Pelvic Floor Contraction  - 1 x daily - 7 x weekly - 2-3 sets - 10 reps - 5 hold  - Pelvic Floor Contractions in [place] with Adduction  - 1 x daily - 7 x weekly - 1-2 sets - 10 reps - 5 hold  - Pelvic Floor Contractions in [place] with Resisted Abduction  - 1 x daily - 7 x weekly - 1-2 sets - 10 reps - 5 hold  -  [place]  - 1 x daily - 7 x weekly - 1-2 sets - 10 reps    Charges: Thera -ex -2, Man Therapy -1       Total Timed Treatment: 40 min  Total Treatment Time: 45 min

## 2024-06-07 ENCOUNTER — OFFICE VISIT (OUTPATIENT)
Dept: PHYSICAL THERAPY | Age: 24
End: 2024-06-07
Attending: NURSE PRACTITIONER
Payer: COMMERCIAL

## 2024-06-07 PROCEDURE — 97140 MANUAL THERAPY 1/> REGIONS: CPT | Performed by: PHYSICAL THERAPIST

## 2024-06-07 PROCEDURE — 97110 THERAPEUTIC EXERCISES: CPT | Performed by: PHYSICAL THERAPIST

## 2024-06-07 NOTE — PROGRESS NOTES
Diagnosis:   Prolapse of anterior vaginal wall (N81.10)  Chronic constipation (K59.09)            Referring Provider: Bing Jara  Date of Evaluation:     5/21/2024    Precautions:  None Next MD visit:   none scheduled  Date of Surgery: n/a   Insurance Primary/Secondary: [employer] / N/A     # Auth Visits: n/a            Subjective: Bowel movements has been every other day and she had to push a little. The snesation of \"bubble\" in the vaginal area is less intense.    Pain: 0/10      Objective:   Palpation:   Pubococcygeus/Pubovaginalis L mild restriction  Iliococcygeus B mild restriction  Coccygeus B mild restriction  Obturator Internus B WNL        Assessment:  Patient tolerated treatment well. Discussed about hydration.Noted improvement of soft tissues mobility, decreased tightness of PF muscles. Patient has improved ability to relax PF muscles. Responded well to manual therapy. PT guided patient through treatment providing visual, verbal, and tactile cues for proper joint alignment/ body positioning, exercise tempo for desired treatment effect of exercises, and assessed response to interventions. Intervention adjusted to patient's tolerance throughout session duration. Discussed importance of each intervention to maximize treatment effect.     Goals:   Goals: (to be met in 8 visits)  Patient will have increased awareness for TA/PF isolation to promote contraction and relaxation, and facilitate activation for normal bowel/bladde voiding patterns   Patient will demonstrate decreased muscles tightness, decreased tenderness, with improve soft tissues mobility to allow PF muscles relaxation for ease of urination/defecation with ease for initiating urine and decreased straining .    Patient to demonstrate improve Core muscles strength to good and PF muscles strength to 4/10/10//10 using the Laycock scale to decrease prolapse symptoms, decrease \"air bubble\" sensation in the vagina and improve pelvic stability.  Patient  will be able to do a diet/bowel log to determine eating/drinking patterns, and improve bowel movement to 1x/week, with improved [place] stool to Type 3-4 consistently.  Patient will verbalize increase in knowledge and understanding of bladder/ bowel/ pelvic mechanics, report regular compliance of established HEP, including diaphragmatic breathing,  to manage symptoms,  and future exacerbations.     Plan: Continue PT as per established POC. TE, core activation   Date: 5/31/2024  TX#: 2/8 Date:                 TX#: 3/8 Date:                 TX#: 4/ Date:                 TX#: 5/ Date:   Tx#: 6/   Nu-step, L3 , 5 mins  Calf stretches on incline 2x 30 secs  Adductor stretches 2x 30 secs  Hamstrings stretches with strap 2x 30 secs  Piriformis stretches 2x 30 secs (fig 4 no lift)  Modified Brian stretches 2x 30 secs  TA brace x 10 with 5 secs  TA brace with PFM with ball squeeze x 10 with 5 secs  TA brace with TB with PFM x 10 with 5 secs  SL clams x 10   Thera -ex 32 Nu-step, L3 , 5 mins  Calf stretches on incline 2x 30 secs  TA brace with hip 4 way with RTB x 10 each leg  TA brace with shoulder extensions, rows and paloff press x 20 green tube  Adductor stretches 2x 30 secs  Hamstrings stretches with strap 2x 30 secs  Piriformis stretches 2x 30 secs (fig 4 no lift)  Modified Brian stretches 2x 30 secs  TA brace x 10 with 5 secs  TA brace with PFM with ball squeeze x 10 with 5 secs  TA brace with TB with PFM x 10 with 5 secs  SL clams x 20   Thera -ex 35      Man therapy- 8 mins  STM for pelvic floor muscles, isolated PF exercises, sustained and rapid contractions  Isolated TA exercises coordinated with breathing Man therapy- 8 mins  STM for pelvic floor muscles, isolated PF exercises, sustained and rapid contractions                    HEP: Access Code: [email or web address]  URL: [email or web address]  Date: 05/31/2024  Prepared by: Gwendolyn Carrillo    Exercises  - Supine Butterfly Groin Stretch  - 1 x daily - 7 x  weekly - 1 sets - 3 reps - 30 hold  - Supine Hamstring Stretch with Strap  - 2 x daily - 7 x weekly - 1 sets - 3 reps - 30 hold  - Supine Figure 4 Piriformis Stretch  - 1 x daily - 7 x weekly - 1-2 sets - 2 reps - 30 hold  - Modified Brian Stretch  - 1 x daily - 7 x weekly - 1 sets - 3 reps - 30 hold  - Supine Transversus Abdominis Bracing with Pelvic Floor Contraction  - 1 x daily - 7 x weekly - 2-3 sets - 10 reps - 5 hold  - Pelvic Floor Contractions in Hooklying with Adduction  - 1 x daily - 7 x weekly - 1-2 sets - 10 reps - 5 hold  - Pelvic Floor Contractions in Hooklying with Resisted Abduction  - 1 x daily - 7 x weekly - 1-2 sets - 10 reps - 5 hold  - Clamshell  - 1 x daily - 7 x weekly - 1-2 sets - 10 reps    Charges: Thera -ex -2, Man Therapy -1       Total Timed Treatment: 45 min  Total Treatment Time: 45 min

## 2024-06-14 ENCOUNTER — OFFICE VISIT (OUTPATIENT)
Dept: PHYSICAL THERAPY | Age: 24
End: 2024-06-14
Attending: NURSE PRACTITIONER
Payer: COMMERCIAL

## 2024-06-14 PROCEDURE — 97110 THERAPEUTIC EXERCISES: CPT | Performed by: PHYSICAL THERAPIST

## 2024-06-14 NOTE — PROGRESS NOTES
Diagnosis:   Prolapse of anterior vaginal wall (N81.10)  Chronic constipation (K59.09)            Referring Provider: Bing Jara  Date of Evaluation:     5/21/2024    Precautions:  None Next MD visit:   none scheduled  Date of Surgery: n/a   Insurance Primary/Secondary: [employer]IG[employer] / N/A     # Auth Visits: n/a            Subjective: Patient reports that bowel movements every day with a little push. The air bubble in the vaginal area is better, she does not feel it all the time anymore, just sometimes.    Pain: 0/10      Objective:   Tightness on B piriformis, L hamstrings and B iliopsoas muscles. Impaired TA and respiration coordination. Tendency of L LE to IR.        Assessment:  Patient is progressing toward PT goals.Corrected patient's stance as she has tendency to IR on L LE, assuming genu valgus and toes turned in position. Added exercises to improve hip strength . PT guided patient through treatment providing visual, verbal, and tactile cues for proper joint alignment/ body positioning, exercise tempo for desired treatment effect of exercises, and assessed response to interventions. Intervention adjusted to patient's tolerance throughout session duration. Discussed importance of each intervention to maximize treatment effect.     Goals:   Goals: (to be met in 8 visits)  Patient will have increased awareness for TA/PF isolation to promote contraction and relaxation, and facilitate activation for normal bowel/bladder voiding patterns   Patient will demonstrate decreased muscles tightness, decreased tenderness, with improve soft tissues mobility to allow PF muscles relaxation for ease of urination/defecation with ease for initiating urine and decreased straining .    Patient to demonstrate improve Core muscles strength to good and PF muscles strength to 4/10/10//10 using the Laycock scale to decrease prolapse symptoms, decrease \"air bubble\" sensation in the vagina and improve pelvic stability.  Patient will be  able to do a diet/bowel log to determine eating/drinking patterns, and improve bowel movement to 1x/day, with improved [place] stool to Type 3-4 consistently.  Patient will verbalize increase in knowledge and understanding of bladder/ bowel/ pelvic mechanics, report regular compliance of established HEP, including diaphragmatic breathing,  to manage symptoms,  and future exacerbations.     Plan: Continue PT as per established POC. TE, core activation, internal MT as needed  Date: 5/31/2024  TX#: 2/8 Date:    6/7/2024             TX#: 3/8 Date:      6/14/2024            TX#: 4/8 Date:                 TX#: 5/ Date:   Tx#: 6/   Nu-step, L3 , 5 mins  Calf stretches on incline 2x 30 secs  Adductor stretches 2x 30 secs  Hamstrings stretches with strap 2x 30 secs  Piriformis stretches 2x 30 secs (fig 4 no lift)  Modified Brian stretches 2x 30 secs  TA brace x 10 with 5 secs  TA brace with PFM with ball squeeze x 10 with 5 secs  TA brace with TB with PFM x 10 with 5 secs  SL clams x 10   Thera -ex 32 Nu-step, L3 , 5 mins  Calf stretches on incline 2x 30 secs  TA brace with hip 4 way with RTB x 10 each leg  TA brace with shoulder extensions, rows and paloff press x 20 green tube  Adductor stretches 2x 30 secs  Hamstrings stretches with strap 2x 30 secs  Piriformis stretches 2x 30 secs (fig 4 no lift)  Modified Brian stretches 2x 30 secs  TA brace x 10 with 5 secs  TA brace with PFM with ball squeeze x 10 with 5 secs  TA brace with TB with PFM x 10 with 5 secs  SL clams x 20   Thera -ex 35 Nu-step, L3 , 5 mins  Calf stretches on incline 2x 30 secs  TA brace with hip abd, ext with RTB x 20 each leg  TA brace with shoulder extensions, and paloff press x 20 green tube  Shuttle squats N (with orange ball between knees) and ER x 20, 25#  Adductor stretches 2x 30 secs  Hamstrings stretches with strap 2x 30 secs  Piriformis stretches 2x 30 secs (fig 4 no lift)  Modified Brian stretches 2x 30 secs  TA brace x 10 with 5 secs  TA  brace with PFM with DC for add x 10 with 5 secs  TA brace with DC for abd  x 10 with 5 secs  SL clams x 20   Thera -ex 40     Man therapy- 8 mins  STM for pelvic floor muscles, isolated PF exercises, sustained and rapid contractions  Isolated TA exercises coordinated with breathing Man therapy- 8 mins  STM for pelvic floor muscles, isolated PF exercises, sustained and rapid contractions                    HEP: Access Code: [email or web address]  URL: [email or web address]  Date: 05/31/2024  Prepared by: Gwendolyn Carrillo    Exercises  - Supine Butterfly Groin Stretch  - 1 x daily - 7 x weekly - 1 sets - 3 reps - 30 hold  - Supine Hamstring Stretch with Strap  - 2 x daily - 7 x weekly - 1 sets - 3 reps - 30 hold  - Supine Figure 4 Piriformis Stretch  - 1 x daily - 7 x weekly - 1-2 sets - 2 reps - 30 hold  - Modified Brian Stretch  - 1 x daily - 7 x weekly - 1 sets - 3 reps - 30 hold  - Supine Transversus Abdominis Bracing with Pelvic Floor Contraction  - 1 x daily - 7 x weekly - 2-3 sets - 10 reps - 5 hold  - Pelvic Floor Contractions in Hooklying with Adduction  - 1 x daily - 7 x weekly - 1-2 sets - 10 reps - 5 hold  - Pelvic Floor Contractions in Hooklying with Resisted Abduction  - 1 x daily - 7 x weekly - 1-2 sets - 10 reps - 5 hold  - Clamshell  - 1 x daily - 7 x weekly - 1-2 sets - 10 reps    Charges: Thera -ex -3          Total Timed Treatment: 40 min  Total Treatment Time: 40 min   H/O vasectomy    History of inguinal herniorrhaphy  1998, 1997  S/P carotid endarterectomy  left 11/9/2016  S/P cholecystectomy  2003  S/P insertion of intrathecal pump  placed in 2007  removed in 2008

## 2024-06-21 ENCOUNTER — OFFICE VISIT (OUTPATIENT)
Dept: PHYSICAL THERAPY | Age: 24
End: 2024-06-21
Attending: NURSE PRACTITIONER

## 2024-06-21 PROCEDURE — 97110 THERAPEUTIC EXERCISES: CPT | Performed by: PHYSICAL THERAPIST

## 2024-06-21 NOTE — PROGRESS NOTES
Diagnosis:   Prolapse of anterior vaginal wall (N81.10)  Chronic constipation (K59.09)            Referring Provider: Bing Jara  Date of Evaluation:     5/21/2024    Precautions:  None Next MD visit:   none scheduled  Date of Surgery: n/a   Insurance Primary/Secondary: CIGNA / N/A     # Auth Visits: n/a            Subjective: Patient reports same symptoms as last week, Has BM ecvery with a little push. The air bubble in the vaginal area is better, almost gone.    Pain: 0/10      Objective:   Tightness on B piriformis, L hamstrings and B iliopsoas muscles. Impaired TA and respiration coordination. Tendency of L LE to IR        Assessment:  Increased intensity of exercises to challenge core and PF muscles. Improved L LE positioning Noted decrease in muscles twitching with strengthening exercises. PT guided patient through treatment providing visual, verbal, and tactile cues for proper joint alignment/ body positioning, exercise tempo for desired treatment effect of exercises, and assessed response to interventions. Intervention adjusted to patient's tolerance throughout session duration. Discussed importance of each intervention to maximize treatment effect. Discussed about keeping diet log to pinpoint foods that can be constipating for her. Patient understood.    Goals:   Goals: (to be met in 8 visits)  Patient will have increased awareness for TA/PF isolation to promote contraction and relaxation, and facilitate activation for normal bowel/bladder voiding patterns   Patient will demonstrate decreased muscles tightness, decreased tenderness, with improve soft tissues mobility to allow PF muscles relaxation for ease of urination/defecation with ease for initiating urine and decreased straining .    Patient to demonstrate improve Core muscles strength to good and PF muscles strength to 4/10/10//10 using the Laycock scale to decrease prolapse symptoms, decrease \"air bubble\" sensation in the vagina and improve  pelvic stability.  Patient will be able to do a diet/bowel log to determine eating/drinking patterns, and improve bowel movement to 1x/day, with improved [place] stool to Type 3-4 consistently.  Patient will verbalize increase in knowledge and understanding of bladder/ bowel/ pelvic mechanics, report regular compliance of established HEP, including diaphragmatic breathing,  to manage symptoms,  and future exacerbations.     Plan: Continue PT as per established POC. TE, core activation, internal MT as needed  Date: 5/31/2024  TX#: 2/8 Date:    6/7/2024             TX#: 3/8 Date:      6/14/2024            TX#: 4/8 Date:  6/21/2024                TX#: 5/8 Date:   Tx#: 6/   Nu-step, L3 , 5 mins  Calf stretches on incline 2x 30 secs  Adductor stretches 2x 30 secs  Hamstrings stretches with strap 2x 30 secs  Piriformis stretches 2x 30 secs (fig 4 no lift)  Modified Brian stretches 2x 30 secs  TA brace x 10 with 5 secs  TA brace with PFM with ball squeeze x 10 with 5 secs  TA brace with TB with PFM x 10 with 5 secs  SL clams x 10   Thera -ex 32 Nu-step, L3 , 5 mins  Calf stretches on incline 2x 30 secs  TA brace with hip 4 way with RTB x 10 each leg  TA brace with shoulder extensions, rows and paloff press x 20 green tube  Adductor stretches 2x 30 secs  Hamstrings stretches with strap 2x 30 secs  Piriformis stretches 2x 30 secs (fig 4 no lift)  Modified Brian stretches 2x 30 secs  TA brace x 10 with 5 secs  TA brace with PFM with ball squeeze x 10 with 5 secs  TA brace with TB with PFM x 10 with 5 secs  SL clams x 20   Thera -ex 35 Nu-step, L3 , 5 mins  Calf stretches on incline 2x 30 secs  TA brace with hip abd, ext with RTB x 20 each leg  TA brace with shoulder extensions, and paloff press x 20 green tube  Shuttle squats N (with orange ball between knees) and ER x 20, 25#  Adductor stretches 2x 30 secs  Hamstrings stretches with strap 2x 30 secs  Piriformis stretches 2x 30 secs (fig 4 no lift)  Modified Brian  stretches 2x 30 secs  TA brace x 10 with 5 secs  TA brace with PFM with WY for add x 10 with 5 secs  TA brace with WY for abd  x 10 with 5 secs  SL clams x 20   Thera -ex 40 Nu-step, L3 , 5 mins  Calf stretches on incline 2x 30 secs  TA brace monster walk, lateral, anterior, posterior x 3 rounds  TA brace with shoulder extensions, and paloff press x 20 green tube  Shuttle squats N (with orange ball between knees) and ER x 20, 31#  Adductor stretches 2x 30 secs  Hamstrings stretches with strap 2x 30 secs  Piriformis stretches 2x 30 secs (fig 4 no lift)  Modified Brian stretches 2x 30 secs  TA brace x 5 with 5 secs  TA brace with PFM x 5 with 5 secs  TA brace with PFM with WY for add x 10 with 5 secs  TA brace with WY for abd  x 10 with 5 secs  SL clams x 20   Isolated PF exercises x 10. 5 secs, rapid exercises x 10  Diaphragmatic breathing x 2 mins  Thera -ex 43    Man therapy- 8 mins  STM for pelvic floor muscles, isolated PF exercises, sustained and rapid contractions  Isolated TA exercises coordinated with breathing Man therapy- 8 mins  STM for pelvic floor muscles, isolated PF exercises, sustained and rapid contractions                    HEP: Access Code: [email or web address]  URL: [email or web address]  Date: 05/31/2024  Prepared by: Gwendolyn Carrillo    Exercises  - Supine Butterfly Groin Stretch  - 1 x daily - 7 x weekly - 1 sets - 3 reps - 30 hold  - Supine Hamstring Stretch with Strap  - 2 x daily - 7 x weekly - 1 sets - 3 reps - 30 hold  - Supine Figure 4 Piriformis Stretch  - 1 x daily - 7 x weekly - 1-2 sets - 2 reps - 30 hold  - Modified Brian Stretch  - 1 x daily - 7 x weekly - 1 sets - 3 reps - 30 hold  - Supine Transversus Abdominis Bracing with Pelvic Floor Contraction  - 1 x daily - 7 x weekly - 2-3 sets - 10 reps - 5 hold  - Pelvic Floor Contractions in Hooklying with Adduction  - 1 x daily - 7 x weekly - 1-2 sets - 10 reps - 5 hold  - Pelvic Floor Contractions in Hooklying with Resisted  Abduction  - 1 x daily - 7 x weekly - 1-2 sets - 10 reps - 5 hold  - Clamshell  - 1 x daily - 7 x weekly - 1-2 sets - 10 reps    Charges: Thera -ex -3          Total Timed Treatment: 40 min  Total Treatment Time: 40 min

## 2024-06-28 ENCOUNTER — OFFICE VISIT (OUTPATIENT)
Dept: PHYSICAL THERAPY | Age: 24
End: 2024-06-28
Attending: NURSE PRACTITIONER

## 2024-06-28 PROCEDURE — 97110 THERAPEUTIC EXERCISES: CPT | Performed by: PHYSICAL THERAPIST

## 2024-06-28 NOTE — PROGRESS NOTES
Diagnosis:   Prolapse of anterior vaginal wall (N81.10)  Chronic constipation (K59.09)            Referring Provider: Bing Jara  Date of Evaluation:     5/21/2024    Precautions:  None Next MD visit:   none scheduled  Date of Surgery: n/a   Insurance Primary/Secondary: CIGNA / N/A     # Auth Visits: n/a            Subjective:   Patient reports that the air bubble is almost completely gone, she only feels it now when she is straining when she has BM, which  she has been avoiding. BM everyday or every other day, sometimes has to push initially.     Pain: 0/10      Objective:   Tightness on B piriformis, L hamstrings and B iliopsoas muscles. Impaired TA and respiration coordination.         Assessment:    Increased intensity of exercises to challenge core and PF muscles, attempted to use red TB for increased resistance during extensions but patient did not maintain good form with exercises, so was discontinued and returned to using green tube. Improved L LE positioning , now no IR on the L LE did not need the ball to maintain knee positioning in neutral. Noted decrease in muscles twitching with strengthening exercises. Discussed about going to the bathroom to have BM and not to delay bowel urgencies since it can make the stool harder due to colon reabsorption of fluids from the stool. PT guided patient through treatment providing visual, verbal, and tactile cues for proper joint alignment/ body positioning, exercise tempo for desired treatment effect of exercises, and assessed response to interventions. Intervention adjusted to patient's tolerance throughout session duration.     Goals:   Goals: (to be met in 8 visits)  Patient will have increased awareness for TA/PF isolation to promote contraction and relaxation, and facilitate activation for normal bowel/bladder voiding patterns   Patient will demonstrate decreased muscles tightness, decreased tenderness, with improve soft tissues mobility to allow PF muscles  relaxation for ease of urination/defecation with ease for initiating urine and decreased straining .    Patient to demonstrate improve Core muscles strength to good and PF muscles strength to 4/10/10//10 using the Laycock scale to decrease prolapse symptoms, decrease \"air bubble\" sensation in the vagina and improve pelvic stability.  Patient will be able to do a diet/bowel log to determine eating/drinking patterns, and improve bowel movement to 1x/day, with improved [place] stool to Type 3-4 consistently.  Patient will verbalize increase in knowledge and understanding of bladder/ bowel/ pelvic mechanics, report regular compliance of established HEP, including diaphragmatic breathing,  to manage symptoms,  and future exacerbations.     Plan: Continue PT as per established POC. TE, core activation, internal MT as needed  Date: 5/31/2024  TX#: 2/8 Date:    6/7/2024             TX#: 3/8 Date:      6/14/2024            TX#: 4/8 Date:  6/21/2024                TX#: 5/8 Date: 6/28/2024   Tx#: 6/8   Nu-step, L3 , 5 mins  Calf stretches on incline 2x 30 secs  Adductor stretches 2x 30 secs  Hamstrings stretches with strap 2x 30 secs  Piriformis stretches 2x 30 secs (fig 4 no lift)  Modified Brian stretches 2x 30 secs  TA brace x 10 with 5 secs  TA brace with PFM with ball squeeze x 10 with 5 secs  TA brace with TB with PFM x 10 with 5 secs  SL clams x 10   Thera -ex 32 Nu-step, L3 , 5 mins  Calf stretches on incline 2x 30 secs  TA brace with hip 4 way with RTB x 10 each leg  TA brace with shoulder extensions, rows and paloff press x 20 green tube  Adductor stretches 2x 30 secs  Hamstrings stretches with strap 2x 30 secs  Piriformis stretches 2x 30 secs (fig 4 no lift)  Modified Brian stretches 2x 30 secs  TA brace x 10 with 5 secs  TA brace with PFM with ball squeeze x 10 with 5 secs  TA brace with TB with PFM x 10 with 5 secs  SL clams x 20   Thera -ex 35 Nu-step, L3 , 5 mins  Calf stretches on incline 2x 30 secs  TA brace  with hip abd, ext with RTB x 20 each leg  TA brace with shoulder extensions, and paloff press x 20 green tube  Shuttle squats N (with orange ball between knees) and ER x 20, 25#  Adductor stretches 2x 30 secs  Hamstrings stretches with strap 2x 30 secs  Piriformis stretches 2x 30 secs (fig 4 no lift)  Modified Brian stretches 2x 30 secs  TA brace x 10 with 5 secs  TA brace with PFM with WY for add x 10 with 5 secs  TA brace with WY for abd  x 10 with 5 secs  SL clams x 20   Thera -ex 40 Nu-step, L3 , 5 mins  Calf stretches on incline 2x 30 secs  TA brace monster walk, lateral, anterior, posterior x 3 rounds  TA brace with shoulder extensions, and paloff press x 20 green tube  Shuttle squats N (with orange ball between knees) and ER x 20, 31#  Adductor stretches 2x 30 secs  Hamstrings stretches with strap 2x 30 secs  Piriformis stretches 2x 30 secs (fig 4 no lift)  Modified Brian stretches 2x 30 secs  TA brace x 5 with 5 secs  TA brace with PFM x 5 with 5 secs  TA brace with PFM with WY for add x 10 with 5 secs  TA brace with WY for abd  x 10 with 5 secs  SL clams x 20   Isolated PF exercises x 10. 5 secs, rapid exercises x 10  Diaphragmatic breathing x 2 mins  Thera -ex 43 TM 2.5 mph x 5 mins  Calf stretches on incline 2x 30 secs  TA brace monster walk, lateral, anterior, posterior x 3 rounds  Attempted to use red tube but patient did not maintain good form with performance  TA brace with shoulder extensions, and paloff press x 20 green tube  Shuttle squats N  and ER x 20, 37#  Adductor stretches 2x 30 secs  Hamstrings stretches with strap 2x 30 secs  Piriformis stretches 2x 30 secs (fig 4 no lift)  Modified Brian stretches 2x 30 secs  TA brace x 10 with 5 secs  TA brace with PFM x 5 with 5 secs  TA brace with PFM with WY for add x 10 with 5 secs  TA brace with WY for abd  x 10 with 5 secs  SL clams x 20   [place] Level 1 x10  Thera -ex 45   Man therapy- 8 mins  STM for pelvic floor muscles, isolated PF  exercises, sustained and rapid contractions  Isolated TA exercises coordinated with breathing Man therapy- 8 mins  STM for pelvic floor muscles, isolated PF exercises, sustained and rapid contractions                    HEP: Access Code: [email or web address]  URL: [email or web address]  Date: 05/31/2024  Prepared by: Gwendolyn Carrillo    Exercises  - Supine Butterfly Groin Stretch  - 1 x daily - 7 x weekly - 1 sets - 3 reps - 30 hold  - Supine Hamstring Stretch with Strap  - 2 x daily - 7 x weekly - 1 sets - 3 reps - 30 hold  - Supine Figure 4 Piriformis Stretch  - 1 x daily - 7 x weekly - 1-2 sets - 2 reps - 30 hold  - Modified Brian Stretch  - 1 x daily - 7 x weekly - 1 sets - 3 reps - 30 hold  - Supine Transversus Abdominis Bracing with Pelvic Floor Contraction  - 1 x daily - 7 x weekly - 2-3 sets - 10 reps - 5 hold  - Pelvic Floor Contractions in Hooklying with Adduction  - 1 x daily - 7 x weekly - 1-2 sets - 10 reps - 5 hold  - Pelvic Floor Contractions in Hooklying with Resisted Abduction  - 1 x daily - 7 x weekly - 1-2 sets - 10 reps - 5 hold  - Clamshell  - 1 x daily - 7 x weekly - 1-2 sets - 10 reps    Charges: Thera -ex -3          Total Timed Treatment: 45 min  Total Treatment Time: 45 min   Resident

## 2024-07-05 ENCOUNTER — OFFICE VISIT (OUTPATIENT)
Dept: PHYSICAL THERAPY | Age: 24
End: 2024-07-05
Attending: NURSE PRACTITIONER
Payer: COMMERCIAL

## 2024-07-05 PROCEDURE — 97110 THERAPEUTIC EXERCISES: CPT | Performed by: PHYSICAL THERAPIST

## 2024-07-05 NOTE — PROGRESS NOTES
Diagnosis:   Prolapse of anterior vaginal wall (N81.10)  Chronic constipation (K59.09)            Referring Provider: Bing Jara  Date of Evaluation:     5/21/2024    Precautions:  None Next MD visit:   none scheduled  Date of Surgery: n/a   Insurance Primary/Secondary: [employer] / N/A     # Auth Visits: n/a            Subjective:   Patient reports that the air bubble started feeling a little bit more noticeable again Tuesday or Wednesday, but not as bad. BM everyday or every other day, sometimes has to push a little initially.     Pain: 0/10      Objective:   Tightness on B piriformis, L hamstrings and B iliopsoas muscles. Impaired TA and respiration coordination.   Able to keep L LE in neutral with LE strengthening exercises.         Assessment: Patient is progressing well with PT, able to demonstrate good LE positioning with exercises, also was able to activate TA with LE exercises, improved coordination. Decreased muscles twitching during strengthening exercises. Continued to discuss about diet and hydration.     Goals:   Goals: (to be met in 8 visits)  Patient will have increased awareness for TA/PF isolation to promote contraction and relaxation, and facilitate activation for normal bowel/bladder voiding patterns   Patient will demonstrate decreased muscles tightness, decreased tenderness, with improve soft tissues mobility to allow PF muscles relaxation for ease of urination/defecation with ease for initiating urine and decreased straining .    Patient to demonstrate improve Core muscles strength to good and PF muscles strength to 4/10/10//10 using the Laycock scale to decrease prolapse symptoms, decrease \"air bubble\" sensation in the vagina and improve pelvic stability.  Patient will be able to do a diet/bowel log to determine eating/drinking patterns, and improve bowel movement to 1x/day, with improved [place] stool to Type 3-4 consistently.  Patient will verbalize increase in knowledge and understanding  of bladder/ bowel/ pelvic mechanics, report regular compliance of established HEP, including diaphragmatic breathing,  to manage symptoms,  and future exacerbations.     Plan: Continue PT as per established POC. TE, core activation, internal assessment next visit  Date: 7/5/2024   Tx #: 7/8       Elliptical x 5 min, level 1  Calf stretches on incline 2x 30 secs  TA brace monster walk, lateral, anterior, posterior x 3 rounds  Attempted to use red tube but patient did not maintain good form with performance  TA brace with shoulder extensions, and paloff press x 20 green tube  Shuttle squats N  and ER x 20, 37#  Adductor stretches 2x 30 secs  Hamstrings stretches with strap 2x 30 secs  Piriformis stretches 2x 30 secs (fig 4 no lift)  Modified Brian stretches 2x 30 secs  TA brace with PFM x 10 with 5 secs  TA brace with PFM with [place] for add x 10 with 5 secs  TA brace with [place] for abd  x 10 with 5 secs  TA brace with bridge x 10  SL clams x 20   Sahrmann Level 1 x10  Diaphragmatic breathing x 2 mins  Thera -ex 45                            HEP: Access Code: [email or web address]  URL: [email or web address]  Date: 05/31/2024  Prepared by: Gwendolyn Carrillo    Exercises  - Supine Butterfly Groin Stretch  - 1 x daily - 7 x weekly - 1 sets - 3 reps - 30 hold  - Supine Hamstring Stretch with Strap  - 2 x daily - 7 x weekly - 1 sets - 3 reps - 30 hold  - Supine Figure 4 Piriformis Stretch  - 1 x daily - 7 x weekly - 1-2 sets - 2 reps - 30 hold  - Modified Brian Stretch  - 1 x daily - 7 x weekly - 1 sets - 3 reps - 30 hold  - Supine Transversus Abdominis Bracing with Pelvic Floor Contraction  - 1 x daily - 7 x weekly - 2-3 sets - 10 reps - 5 hold  - Pelvic Floor Contractions in Hooklying with Adduction  - 1 x daily - 7 x weekly - 1-2 sets - 10 reps - 5 hold  - Pelvic Floor Contractions in Hooklying with Resisted Abduction  - 1 x daily - 7 x weekly - 1-2 sets - 10 reps - 5 hold  - Clamshell  - 1 x daily - 7 x weekly - 1-2 sets  - 10 reps    Charges: Thera -ex -3          Total Timed Treatment: 45 min  Total Treatment Time: 45 min

## 2024-07-09 ENCOUNTER — PATIENT MESSAGE (OUTPATIENT)
Dept: FAMILY MEDICINE CLINIC | Facility: CLINIC | Age: 24
End: 2024-07-09

## 2024-07-09 NOTE — TELEPHONE ENCOUNTER
From: Quita Gomes  To: Corby Ritter  Sent: 7/9/2024 3:02 PM CDT  Subject: ENT Referral     Hi Corby,     I was just hoping you could refer me to a different ENT than I saw before, it’s been well over a year now & I still have fluid in my ears. They told me they couldn’t help me because they couldn’t see the fluid & I could hear fine, but it’s definitely still there. It’s just really starting to drive me crazy, so I figured I would ask. Thank you in advance!     Quita Gomes

## 2024-07-11 ENCOUNTER — OFFICE VISIT (OUTPATIENT)
Dept: OBGYN CLINIC | Facility: CLINIC | Age: 24
End: 2024-07-11
Payer: COMMERCIAL

## 2024-07-11 VITALS
SYSTOLIC BLOOD PRESSURE: 120 MMHG | BODY MASS INDEX: 26 KG/M2 | HEART RATE: 95 BPM | WEIGHT: 173.63 LBS | DIASTOLIC BLOOD PRESSURE: 68 MMHG

## 2024-07-11 DIAGNOSIS — R10.2 VAGINAL PAIN: Primary | ICD-10-CM

## 2024-07-11 PROCEDURE — 99213 OFFICE O/P EST LOW 20 MIN: CPT | Performed by: NURSE PRACTITIONER

## 2024-07-11 PROCEDURE — 81514 NFCT DS BV&VAGINITIS DNA ALG: CPT | Performed by: NURSE PRACTITIONER

## 2024-07-11 RX ORDER — DROSPIRENONE AND ETHINYL ESTRADIOL 0.02-3(28)
1 KIT ORAL DAILY
COMMUNITY
Start: 2024-06-11

## 2024-07-11 NOTE — PROGRESS NOTES
Gyne note     S: patient is a 23 year old yo  here for an approximate 1 week history of vaginal pain, Her symptoms are similar to when she was seen in 2024. She has a slight increase in discharge but no odor, itching, or burning. She denies any urinary symptoms.    Review of Systems:  General: denies fevers, chills, fatigue and malaise.     O:/68   Pulse 95   Wt 173 lb 9.6 oz (78.7 kg)   LMP 2023 (Approximate)   BMI 26.40 kg/m²   Gen NAD     GYNE/: External Genitalia: Normal appearing, no lesions. Urethral meatus appear wnl, no abnormal discharge or lesions noted.                                Vagina: normal pink mucosa, no lesions, scant thin white discharge.                      Cervix: nulliparous, no lesions                          A/P:  1. Vaginal pain  - Vaginitis Vaginosis PCR Panel; Future

## 2024-07-12 ENCOUNTER — OFFICE VISIT (OUTPATIENT)
Dept: PHYSICAL THERAPY | Age: 24
End: 2024-07-12
Attending: NURSE PRACTITIONER
Payer: COMMERCIAL

## 2024-07-12 PROCEDURE — 97140 MANUAL THERAPY 1/> REGIONS: CPT | Performed by: PHYSICAL THERAPIST

## 2024-07-12 PROCEDURE — 97110 THERAPEUTIC EXERCISES: CPT | Performed by: PHYSICAL THERAPIST

## 2024-07-12 NOTE — PROGRESS NOTES
Diagnosis:   Prolapse of anterior vaginal wall (N81.10)  Chronic constipation (K59.09)            Referring Provider: Bing Jara  Date of Evaluation:     5/21/2024    Precautions:  None Next MD visit:   none scheduled  Date of Surgery: n/a   Insurance Primary/Secondary: [employer] / N/A     # Auth Visits: n/a            Subjective: Patient reports that this week, the feeling of \"bubble\" in the vaginal region is a little bit more noticeable. She does not know what has triggered it. She had bowel movements everyday,     Pain: 0/10      Objective:   Tightness on B piriformis, L hamstrings and B iliopsoas muscles. Impaired TA and respiration coordination.   Able to keep L LE in neutral with LE strengthening exercises.     Informed consent for internal pelvic evaluation given: Yes       Pelvic Floor Muscle strength: (PERF= Power/Endurance/Reps/Fast) MMT: 3/5/8/410 vaginal   Internal Palpation: vaginal, WNL except Deep Transverse Perineal B moderate restriction and tenderness  Compress Urethra/Sphincter B minimal restriction and tenderness  Urethrovaginalis B minimal restriction and tenderness  Pubococcygeus/Pubovaginalis  R mild restrictions, L severe restriction  Iliococcygeus R mild restrictions, L  moderate restriction  Coccygeus R mild restrictions, L  moderate restriction  Obturator Internus B mild restrictions          Assessment: Noted increase in tightness and STRs on L >R PF region, performed manual therapy, stretching and STM to decrease muscles tightness and improve soft tissues mobility.  Patient has improving PF muscles strength. Patient will benefit from further PT to decrease muscles tightness, and improve PF muscles strength and promote lumbopelvic stability.     Goals:   Goals: (to be met in 8 visits)  Patient will have increased awareness for TA/PF isolation to promote contraction and relaxation, and facilitate activation for normal bowel/bladder voiding patterns   Patient will demonstrate decreased  muscles tightness, decreased tenderness, with improve soft tissues mobility to allow PF muscles relaxation for ease of urination/defecation with ease for initiating urine and decreased straining .    Patient to demonstrate improve Core muscles strength to good and PF muscles strength to 4/10/10//10 using the Laycock scale to decrease prolapse symptoms, decrease \"air bubble\" sensation in the vagina and improve pelvic stability.  Patient will be able to do a diet/bowel log to determine eating/drinking patterns, and improve bowel movement to 1x/day, with improved [place] stool to Type 3-4 consistently.  Patient will verbalize increase in knowledge and understanding of bladder/ bowel/ pelvic mechanics, report regular compliance of established HEP, including diaphragmatic breathing,  to manage symptoms,  and future exacerbations.     Plan: Continue PT as per established POC. TE, core activation, reassessment  Date: 7/5/2024   Tx #: 7/8 Date: 7/12/2024   Tx #: 8/8      Elliptical x 5 min, level 1  Calf stretches on incline 2x 30 secs  TA brace monster walk, lateral, anterior, posterior x 3 rounds  Attempted to use red tube but patient did not maintain good form with performance  TA brace with shoulder extensions, and paloff press x 20 green tube  Shuttle squats N  and ER x 20, 37#  Adductor stretches 2x 30 secs  Hamstrings stretches with strap 2x 30 secs  Piriformis stretches 2x 30 secs (fig 4 no lift)  Modified Brian stretches 2x 30 secs  TA brace with PFM x 10 with 5 secs  TA brace with PFM with [place] for add x 10 with 5 secs  TA brace with [place] for abd  x 10 with 5 secs  TA brace with bridge x 10  SL clams x 20   Sahrmann Level 1 x10  Diaphragmatic breathing x 2 mins  Thera -ex 45 Elliptical x 5 min, level 1  Calf stretches on incline 2x 30 secs  TA brace monster walk, lateral, anterior, posterior x 3 rounds  TA brace with shoulder extensions, and paloff press x 20 green tube  Shuttle squats N  and ER x 20, 43#  Hamstrings  stretches with strap 2x 30 secs  Piriformis stretches 2x 30 secs (fig 4 no lift)  Modified Brian stretches 2x 30 secs  Thera ex - 35 mins  Man Therapy 10 mins  STM and stretching for PF muscles, isolated PF exercises                           HEP: Access Code: [email or web address]  URL: [email or web address]  Date: 05/31/2024  Prepared by: Gwendolyn Carrillo    Exercises  - Supine Butterfly Groin Stretch  - 1 x daily - 7 x weekly - 1 sets - 3 reps - 30 hold  - Supine Hamstring Stretch with Strap  - 2 x daily - 7 x weekly - 1 sets - 3 reps - 30 hold  - Supine Figure 4 Piriformis Stretch  - 1 x daily - 7 x weekly - 1-2 sets - 2 reps - 30 hold  - Modified Brian Stretch  - 1 x daily - 7 x weekly - 1 sets - 3 reps - 30 hold  - Supine Transversus Abdominis Bracing with Pelvic Floor Contraction  - 1 x daily - 7 x weekly - 2-3 sets - 10 reps - 5 hold  - Pelvic Floor Contractions in Hooklying with Adduction  - 1 x daily - 7 x weekly - 1-2 sets - 10 reps - 5 hold  - Pelvic Floor Contractions in Hooklying with Resisted Abduction  - 1 x daily - 7 x weekly - 1-2 sets - 10 reps - 5 hold  - Clamshell  - 1 x daily - 7 x weekly - 1-2 sets - 10 reps    Charges: Thera -ex -2, Man Therapy -1     Total Timed Treatment: 45 min  Total Treatment Time: 45 min

## 2024-07-13 LAB
BV BACTERIA DNA VAG QL NAA+PROBE: POSITIVE
C GLABRATA DNA VAG QL NAA+PROBE: NEGATIVE
C KRUSEI DNA VAG QL NAA+PROBE: NEGATIVE
CANDIDA DNA VAG QL NAA+PROBE: NEGATIVE
T VAGINALIS DNA VAG QL NAA+PROBE: NEGATIVE

## 2024-07-14 DIAGNOSIS — B96.89 BV (BACTERIAL VAGINOSIS): Primary | ICD-10-CM

## 2024-07-14 DIAGNOSIS — N76.0 BV (BACTERIAL VAGINOSIS): Primary | ICD-10-CM

## 2024-07-14 RX ORDER — METRONIDAZOLE 7.5 MG/G
1 GEL VAGINAL NIGHTLY
Qty: 70 G | Refills: 0 | Status: SHIPPED | OUTPATIENT
Start: 2024-07-14 | End: 2024-07-19

## 2024-07-19 ENCOUNTER — APPOINTMENT (OUTPATIENT)
Dept: PHYSICAL THERAPY | Age: 24
End: 2024-07-19
Attending: NURSE PRACTITIONER
Payer: COMMERCIAL

## 2024-07-26 ENCOUNTER — OFFICE VISIT (OUTPATIENT)
Dept: PHYSICAL THERAPY | Age: 24
End: 2024-07-26
Attending: NURSE PRACTITIONER
Payer: COMMERCIAL

## 2024-07-26 PROCEDURE — 97110 THERAPEUTIC EXERCISES: CPT | Performed by: PHYSICAL THERAPIST

## 2024-07-26 PROCEDURE — 97140 MANUAL THERAPY 1/> REGIONS: CPT | Performed by: PHYSICAL THERAPIST

## 2024-07-26 NOTE — PROGRESS NOTES
Diagnosis:   Prolapse of anterior vaginal wall (N81.10)  Chronic constipation (K59.09)            Referring Provider: Bing Jara  Date of Evaluation:     5/21/2024    Precautions:  None Next MD visit:   none scheduled  Date of Surgery: n/a   Insurance Primary/Secondary: [employer]IG[employer] / N/A     # Auth Visits: n/a           Progress Summary  Pt has attended 9 visits in Physical Therapy.      Subjective:   Patient reports that the bubble feeling is barely there but she can still feel it. It is definitely better than when she first came it. Rated the discomfort at 4/10. Had 1 episode of pain with sexual activity. Reports that in the past 2 weeks, she had 2 days that she was not able to do her stretches, but she was able to do her Kegels exercises everyday whenever she thinks about it. Less straining when she has the bowel movement now, and less discomfort. She has been having BM everyday in the past week.    Pain: 0/10      Objective:     Posture: R shoulder higher than L  Pelvic Alignment: WNL  Gait: pt ambulates on level ground with normal mechanics.      External Palpation: non tender  Scars (location/surgery): none  Abdominal Wall Integrity: Mild STRs  Diastasis Recti: (finger width depth while contracted)- none     Range Of Motion  Lumbar AROM screen: WNL  LE AROM screen: grossly WNL      Strength (MMT) 5/5 QUYEN LE   Transverse Abdominis: 3/5     Flexibility Summary: WNL QUYEN LE     Special Tests  ASLR     Informed consent for internal pelvic evaluation given: Yes     External Observation:   Voluntary contraction: present   Voluntary relaxation: present  Involuntary contraction: absent  Involuntary relaxation: present     Mons pubis: WNL  Labia majora: WNL  Labia minora: WNL  Urethral meatus: WNL  Introitus: other: Tight   Perineal body: WNL     Sensory/Reflex:  Vestibule: normal bilaterally  Anal [place]: normal bilaterally     Internal Examination   Scar: n/a     Pelvic Floor Muscle strength: (PERF=  Power/Endurance/Reps/Fast) MMT: 3/10/8/10 vaginal   Accessory Muscle Use: adductors, abdominals    Internal Palpation: vaginal, WNL except   Pubococcygeus/Pubovaginalis:  L Mild restriction  Iliococcygeus  L  Mild restriction  Coccygeus WNL  Obturator Internus WNL    Tissue Laxity Test:  Anterior Wall: Min  Posterior Wall: WNL  Apical: WNL     Eccentric lengthening contraction: Fair         Assessment: Patient progressed well with PT treatments, noted improvement with ability to activate and isolate PF muscles leading to improvement with PF strength and decrease in symptoms. Patient also has decreasing muscles tightness and improving soft tissues mobility. Patient is below their previous level of function and will benefit from skills and knowledge of PT to manage/ decrease symptoms and achieve established goals and return to PLOF.      Goals:   Goals: (to be met in 8 visits), updated, in 12 visits  Patient will have increased awareness for TA/PF isolation to promote contraction and relaxation, and facilitate activation for normal bowel/bladder voiding patterns - progressing as of 7/26/24  Patient will demonstrate decreased muscles tightness, decreased tenderness, with improve soft tissues mobility to allow PF muscles relaxation for ease of urination/defecation with ease for initiating urine and decreased straining .  MET as of 7/26/24  Patient to demonstrate improve Core muscles strength to good and PF muscles strength to 4/10/10//10 using the Laycock scale to decrease prolapse symptoms, decrease \"air bubble\" sensation in the vagina and improve pelvic stability.- progressing as of 7/26/24  Patient will be able to do a diet/bowel log to determine eating/drinking patterns, and improve bowel movement to 1x/day, with improved [place] stool to Type 3-4 consistently.-MET as of 7/26/24  Patient will verbalize increase in knowledge and understanding of bladder/ bowel/ pelvic mechanics, report regular compliance of  established HEP, including diaphragmatic breathing,  to manage symptoms,  and future exacerbations. - progressing as of 7/26/24    Plan: Continue PT as per established POC. TE, core activation  Date: 7/5/2024   Tx #: 7/8 Date: 7/12/2024   Tx #: 8/8 Date: 7/26/2024   Tx  # : 9/12     Elliptical x 5 min, level 1  Calf stretches on incline 2x 30 secs  TA brace monster walk, lateral, anterior, posterior x 3 rounds  Attempted to use red tube but patient did not maintain good form with performance  TA brace with shoulder extensions, and paloff press x 20 green tube  Shuttle squats N  and ER x 20, 37#  Adductor stretches 2x 30 secs  Hamstrings stretches with strap 2x 30 secs  Piriformis stretches 2x 30 secs (fig 4 no lift)  Modified Brian stretches 2x 30 secs  TA brace with PFM x 10 with 5 secs  TA brace with PFM with [place] for add x 10 with 5 secs  TA brace with [place] for abd  x 10 with 5 secs  TA brace with bridge x 10  SL clams x 20   Sahrmann Level 1 x10  Diaphragmatic breathing x 2 mins  Thera -ex 45 Elliptical x 5 min, level 1  Calf stretches on incline 2x 30 secs  TA brace monster walk, lateral, anterior, posterior x 3 rounds  TA brace with shoulder extensions, and paloff press x 20 green tube  Shuttle squats N  and ER x 20, 43#  Hamstrings stretches with strap 2x 30 secs  Piriformis stretches 2x 30 secs (fig 4 no lift)  Modified Brian stretches 2x 30 secs  Thera ex - 35 mins  Man Therapy 10 mins  STM and stretching for PF muscles, isolated PF exercises Elliptical x 5 min, level 1  Calf stretches on incline 2x 30 secs  TA brace monster walk, lateral, anterior, posterior x 3 rounds  TA brace with shoulder extensions, and paloff press x 20 small black tube  Shuttle squats N  and ER x 20, 50#  Hamstrings stretches with strap 2x 30 secs  Piriformis stretches 2x 30 secs (fig 4 no lift)  Modified Brian stretches 2x 30 secs  Thera ex - 30 mins  NMR 10 mins  STM and stretching for PF muscles, isolated PF exercises, coordinated  TA/TE exercises, breathing exercises                              HEP: Access Code: [email or web address]  URL: [email or web address]  Date: 05/31/2024  Prepared by: Gwendolyn Carrillo    Exercises  - Supine Butterfly Groin Stretch  - 1 x daily - 7 x weekly - 1 sets - 3 reps - 30 hold  - Supine Hamstring Stretch with Strap  - 2 x daily - 7 x weekly - 1 sets - 3 reps - 30 hold  - Supine Figure 4 Piriformis Stretch  - 1 x daily - 7 x weekly - 1-2 sets - 2 reps - 30 hold  - Modified Brian Stretch  - 1 x daily - 7 x weekly - 1 sets - 3 reps - 30 hold  - Supine Transversus Abdominis Bracing with Pelvic Floor Contraction  - 1 x daily - 7 x weekly - 2-3 sets - 10 reps - 5 hold  - Pelvic Floor Contractions in Hooklying with Adduction  - 1 x daily - 7 x weekly - 1-2 sets - 10 reps - 5 hold  - Pelvic Floor Contractions in Hooklying with Resisted Abduction  - 1 x daily - 7 x weekly - 1-2 sets - 10 reps - 5 hold  - Clamshell  - 1 x daily - 7 x weekly - 1-2 sets - 10 reps      Plan: Continue skilled Physical Therapy 1 x/week or a total of 4 visits over a 90 day period. Treatment will include: Therapeutic exercises, Manual therapy, NMR, HEP education       Patient/Family/Caregiver was advised of these findings, precautions, and treatment options and has agreed to actively participate in planning and for this course of care.    Thank you for your referral. If you have any questions, please contact me at Dept: [phone].    Sincerely,  Electronically signed by therapist: Gwendolyn Carrillo PT     Physician's certification required:  No  Please co-sign or sign and return this letter via fax as soon as possible to [phone].   I certify the need for these services furnished under this plan of treatment and while under my care.    X___________________________________________________ Date____________________    Certification From: 7/30/2024  To:10/28/2024    Charges: Thera -ex -2, Man Therapy -1     Total Timed Treatment: 45  min  Total Treatment Time: 45 min

## 2024-08-08 ENCOUNTER — TELEPHONE (OUTPATIENT)
Dept: PHYSICAL THERAPY | Facility: HOSPITAL | Age: 24
End: 2024-08-08

## 2024-08-13 ENCOUNTER — OFFICE VISIT (OUTPATIENT)
Dept: PHYSICAL THERAPY | Age: 24
End: 2024-08-13
Attending: NURSE PRACTITIONER
Payer: COMMERCIAL

## 2024-08-13 PROCEDURE — 97110 THERAPEUTIC EXERCISES: CPT | Performed by: PHYSICAL THERAPIST

## 2024-08-13 NOTE — PROGRESS NOTES
Diagnosis:   Prolapse of anterior vaginal wall (N81.10)  Chronic constipation (K59.09)            Referring Provider: Bing Jara  Date of Evaluation:     5/21/2024    Precautions:  None Next MD visit:   none scheduled  Date of Surgery: n/a   Insurance Primary/Secondary: [employer] / N/A     # Auth Visits: n/a            Subjective:   Patient reports that she has been compliant with her HEP, she was also walking everyday. Minimal \"bubble\" feeling in the vaginal area, notices it occasionally when she has some difficulty with BM.    Pain: 0/10      Objective:     Posture: R shoulder higher than L  Pelvic Alignment: WNL  Gait: pt ambulates on level ground with normal mechanics.      External Palpation: non tender  Scars (location/surgery): none  Abdominal Wall Integrity: Mild STRs  Diastasis Recti: (finger width depth while contracted)- none     Range Of Motion  Lumbar AROM screen: WNL  LE AROM screen: grossly WNL      Strength (MMT) 5/5 QUYEN LE   Transverse Abdominis: 3/5     Flexibility Summary: WNL QUYEN LE     Special Tests  ASLR     Informed consent for internal pelvic evaluation given: Yes     External Observation:   Voluntary contraction: present   Voluntary relaxation: present  Involuntary contraction: absent  Involuntary relaxation: present     Mons pubis: WNL  Labia majora: WNL  Labia minora: WNL  Urethral meatus: WNL  Introitus: other: Tight   Perineal body: WNL     Sensory/Reflex:  Vestibule: normal bilaterally  Anal [place]: normal bilaterally     Internal Examination   Scar: n/a     Pelvic Floor Muscle strength: (PERF= Power/Endurance/Reps/Fast) MMT: 3/10/8/10 vaginal   Accessory Muscle Use: adductors, abdominals    Internal Palpation: vaginal, WNL except   Pubococcygeus/Pubovaginalis:  L Mild restriction  Iliococcygeus  L  Mild restriction  Coccygeus WNL  Obturator Internus WNL    Tissue Laxity Test:  Anterior Wall: Min  Posterior Wall: WNL  Apical: WNL     Eccentric lengthening contraction: Fair          Assessment:Patient is progressing well with PT, increased volume and intensity of exercises and was tolerated well with no onset or increase in symptoms. Noted decreased lumbopelvic stability with quadruped UE/LE lifts, changed exercise to just hip extensions.      Goals:   Goals: (to be met in 8 visits), updated, in 12 visits  Patient will have increased awareness for TA/PF isolation to promote contraction and relaxation, and facilitate activation for normal bowel/bladder voiding patterns - progressing as of 7/26/24  Patient will demonstrate decreased muscles tightness, decreased tenderness, with improve soft tissues mobility to allow PF muscles relaxation for ease of urination/defecation with ease for initiating urine and decreased straining .  MET as of 7/26/24  Patient to demonstrate improve Core muscles strength to good and PF muscles strength to 4/10/10//10 using the Laycock scale to decrease prolapse symptoms, decrease \"air bubble\" sensation in the vagina and improve pelvic stability.- progressing as of 7/26/24  Patient will be able to do a diet/bowel log to determine eating/drinking patterns, and improve bowel movement to 1x/day, with improved [place] stool to Type 3-4 consistently.-MET as of 7/26/24  Patient will verbalize increase in knowledge and understanding of bladder/ bowel/ pelvic mechanics, report regular compliance of established HEP, including diaphragmatic breathing,  to manage symptoms,  and future exacerbations. - progressing as of 7/26/24    Plan: Continue PT as per updated POC. TE, core activation  Date: 7/5/2024   Tx #: 7/8 Date: 7/12/2024   Tx #: 8/8 Date: 7/26/2024   Tx  # : 9/12 Date: 8/13/2024   Tx # 10/12    Elliptical x 5 min, level 1  Calf stretches on incline 2x 30 secs  TA brace monster walk, lateral, anterior, posterior x 3 rounds  Attempted to use red tube but patient did not maintain good form with performance  TA brace with shoulder extensions, and paloff press x 20 green  tube  Shuttle squats N  and ER x 20, 37#  Adductor stretches 2x 30 secs  Hamstrings stretches with strap 2x 30 secs  Piriformis stretches 2x 30 secs (fig 4 no lift)  Modified Brian stretches 2x 30 secs  TA brace with PFM x 10 with 5 secs  TA brace with PFM with NE for add x 10 with 5 secs  TA brace with NE for abd  x 10 with 5 secs  TA brace with bridge x 10  SL clams x 20   [place] Level 1 x10  Diaphragmatic breathing x 2 mins  Thera -ex 45 Elliptical x 5 min, level 1  Calf stretches on incline 2x 30 secs  TA brace monster walk, lateral, anterior, posterior x 3 rounds  TA brace with shoulder extensions, and paloff press x 20 green tube  Shuttle squats N  and ER x 20, 43#  Hamstrings stretches with strap 2x 30 secs  Piriformis stretches 2x 30 secs (fig 4 no lift)  Modified Brian stretches 2x 30 secs  Thera ex - 35 mins  Man Therapy 10 mins  STM and stretching for PF muscles, isolated PF exercises Elliptical x 5 min, level 1  Calf stretches on incline 2x 30 secs  TA brace monster walk, lateral, anterior, posterior x 3 rounds  TA brace with shoulder extensions, and paloff press x 20 small black tube  Shuttle squats N  and ER x 20, 50#  Hamstrings stretches with strap 2x 30 secs  Piriformis stretches 2x 30 secs (fig 4 no lift)  Modified Brian stretches 2x 30 secs  Thera ex - 30 mins  NMR 10 mins  STM and stretching for PF muscles, isolated PF exercises, coordinated TA/TE exercises, breathing exercises     Elliptical x 5 min, level 1  Calf stretches on incline 2x 30 secs  TA brace monster walk, lateral, anterior, posterior x 2 rounds  Anterior and lateral lunges on BOSU ball x 20   TRX squats x 20   Hamstrings stretches with strap 2x 30 secs  Piriformis stretches 2x 30 secs (fig 4 no lift)  Modified Brian stretches 2x 30 secs  [facility] Level [facility], 2 x 20 each   Quadruped TA brace x 10 with 5 secs hols  Attempted Alternate UE/LE lifts, bu patient was unstable, and was discontinues, performed alternating LE lefts in  quadruped position instead  Quadruped alternating hip extension x 10  TA brace with bridge x 10 with 5 secs  TA brace with NM push x 10      Thera ex - 40 mins                         HEP: Access Code: [email or web address]  URL: [email or web address]  Date: 05/31/2024  Prepared by: Gwendolyn Carrillo    Exercises  - Supine Butterfly Groin Stretch  - 1 x daily - 7 x weekly - 1 sets - 3 reps - 30 hold  - Supine Hamstring Stretch with Strap  - 2 x daily - 7 x weekly - 1 sets - 3 reps - 30 hold  - Supine Figure 4 Piriformis Stretch  - 1 x daily - 7 x weekly - 1-2 sets - 2 reps - 30 hold  - Modified Brian Stretch  - 1 x daily - 7 x weekly - 1 sets - 3 reps - 30 hold  - Supine Transversus Abdominis Bracing with Pelvic Floor Contraction  - 1 x daily - 7 x weekly - 2-3 sets - 10 reps - 5 hold  - Pelvic Floor Contractions in Hooklying with Adduction  - 1 x daily - 7 x weekly - 1-2 sets - 10 reps - 5 hold  - Pelvic Floor Contractions in Hooklying with Resisted Abduction  - 1 x daily - 7 x weekly - 1-2 sets - 10 reps - 5 hold  - Clamshell  - 1 x daily - 7 x weekly - 1-2 sets - 10 reps      Charges: Thera -ex -3       Total Timed Treatment: 40 min  Total Treatment Time: 40 min

## 2024-08-22 ENCOUNTER — OFFICE VISIT (OUTPATIENT)
Dept: OBGYN CLINIC | Facility: CLINIC | Age: 24
End: 2024-08-22
Payer: COMMERCIAL

## 2024-08-22 VITALS
DIASTOLIC BLOOD PRESSURE: 58 MMHG | SYSTOLIC BLOOD PRESSURE: 108 MMHG | HEIGHT: 68 IN | BODY MASS INDEX: 28.55 KG/M2 | WEIGHT: 188.38 LBS | HEART RATE: 88 BPM

## 2024-08-22 DIAGNOSIS — R63.5 WEIGHT GAIN: Primary | ICD-10-CM

## 2024-08-22 PROCEDURE — 99213 OFFICE O/P EST LOW 20 MIN: CPT | Performed by: NURSE PRACTITIONER

## 2024-08-22 NOTE — PROGRESS NOTES
Gyne note       S: patient is a 24 year old yo  here to discuss weight gain and birth control. She subscribed to mail order birth control around 2023. She has gained around 37 pounds since then.    He weight had fluctuated from  until then as she was around 160 pounds in  then had around 25 pounds weight loss. She assumed it was GI related.     She notes she came off her birth control a few weeks ago and still feels as she is continuing to gain weight.     The only other new medication in this time has been her Remeron which she started in 2023.    Review of Systems:  General: denies fevers, chills, fatigue and malaise.     O:/58 (BP Location: Right arm)   Pulse 88   Ht 68\"   Wt 188 lb 6 oz (85.4 kg)   LMP 2023 (Approximate)   BMI 28.64 kg/m²   Gen NAD     Exam deferred        A/P:  1. Weight gain  - TSH W Reflex To Free T4; Future    Discussed LARC and other combination contraceptives    She would like to use condoms until she gets this weight concern figured out    She will call if she decides she would like to resume any contraception

## 2024-08-23 ENCOUNTER — LAB ENCOUNTER (OUTPATIENT)
Dept: LAB | Age: 24
End: 2024-08-23
Attending: NURSE PRACTITIONER
Payer: COMMERCIAL

## 2024-08-23 DIAGNOSIS — R63.5 WEIGHT GAIN: ICD-10-CM

## 2024-08-23 LAB — TSI SER-ACNC: 0.86 MIU/ML (ref 0.55–4.78)

## 2024-08-23 PROCEDURE — 36415 COLL VENOUS BLD VENIPUNCTURE: CPT

## 2024-08-23 PROCEDURE — 84443 ASSAY THYROID STIM HORMONE: CPT

## 2024-08-28 ENCOUNTER — OFFICE VISIT (OUTPATIENT)
Dept: PHYSICAL THERAPY | Age: 24
End: 2024-08-28
Attending: NURSE PRACTITIONER
Payer: COMMERCIAL

## 2024-08-28 PROCEDURE — 97110 THERAPEUTIC EXERCISES: CPT | Performed by: PHYSICAL THERAPIST

## 2024-08-28 PROCEDURE — 97112 NEUROMUSCULAR REEDUCATION: CPT | Performed by: PHYSICAL THERAPIST

## 2024-08-28 NOTE — PROGRESS NOTES
Diagnosis:   Prolapse of anterior vaginal wall (N81.10)  Chronic constipation (K59.09)            Referring Provider: Bing Jara  Date of Evaluation:     5/21/2024    Precautions:  None Next MD visit:   none scheduled  Date of Surgery: n/a   Insurance Primary/Secondary: [employer] / N/A     # Auth Visits: n/a           Discharge Summary  Pt has attended 10 visits in Physical Therapy.      Subjective: Patient reports that she is 80-90% better. States that the bubble feeling is barely there. She has been compliant with her home exercises, and is walking regularly. She has been having BM almost everyday and she tries not to strain.   Pain: 0/10      Objective:     Posture: WNL  Pelvic Alignment: WNL  Gait: pt ambulates on level ground with normal mechanics.      External Palpation: non tender  Scars (location/surgery): none  Abdominal Wall Integrity: Mild STRs  Diastasis Recti: (finger width depth while contracted)- none     Range Of Motion  Lumbar AROM screen: WNL  LE AROM screen: grossly WNL      Strength (MMT) 5/5 QUYEN LE   Transverse Abdominis: 3/5     Flexibility Summary: WNL QUYEN LE     Special Tests  ASLR- good lumbar loading transfers     Informed consent for internal pelvic evaluation given: Yes     External Observation:   Voluntary contraction: present   Voluntary relaxation: present  Involuntary contraction: absent  Involuntary relaxation: absent     Mons pubis: WNL  Labia majora: WNL  Labia minora: WNL  Urethral meatus: WNL  Introitus: other: Tight   Perineal body: WNL     Sensory/Reflex:  Vestibule: normal bilaterally  Anal [place]: normal bilaterally     Internal Examination   Scar: n/a     Pelvic Floor Muscle strength: (PERF= Power/Endurance/Reps/Fast) MMT: 4/10/10/10 vaginal   Accessory Muscle Use: none    Internal Palpation: vaginal, WNL except   Pubococcygeus/Pubovaginalis:  L Mild restriction  Iliococcygeus  L Mild restrictions  Coccygeus WNL  Obturator Internus WNL    Tissue Laxity Test:  Anterior Wall:  Min  Posterior Wall: WNL  Apical: WNL     Eccentric lengthening contraction: Fair         Assessment:Patient progressed well with PT treatments, noted improvement of ability to isolate and activate PF muscles, noted improvement with strength. Patient also has decreased PF muscles tightness and improved soft tissues mobility. Noted improvement with ability to activate transversus abdominis and coordinate with PF muscles and respiration. Discussed importance of regular compliance with established HEP, patient understood. Provided an updated copy of HEP. Answered patient's questions. Patient is now discharged from PT.     Goals:   Goals: (to be met in 8 visits), updated, in 12 visits  Patient will have increased awareness for TA/PF isolation to promote contraction and relaxation, and facilitate activation for normal bowel/bladder voiding patterns - MET as of 8/28/24  Patient will demonstrate decreased muscles tightness, decreased tenderness, with improve soft tissues mobility to allow PF muscles relaxation for ease of urination/defecation with ease for initiating urine and decreased straining .  MET as of 7/26/24  Patient to demonstrate improve Core muscles strength to good and PF muscles strength to 4/10/10//10 using the Laycock scale to decrease prolapse symptoms, decrease \"air bubble\" sensation in the vagina and improve pelvic stability.- MET as of 8/28/24  Patient will be able to do a diet/bowel log to determine eating/drinking patterns, and improve bowel movement to 1x/day, with improved [place] stool to Type 3-4 consistently.-MET as of 7/26/24  Patient will verbalize increase in knowledge and understanding of bladder/ bowel/ pelvic mechanics, report regular compliance of established HEP, including diaphragmatic breathing,  to manage symptoms,  and future exacerbations. - MET as of 8/28/24    Plan: Patient is now discharged from PT.  Date: 7/5/2024   Tx #: 7/8 Date: 7/12/2024   Tx #: 8/8 Date: 7/26/2024   Tx  # :  9/12 Date: 8/13/2024   Tx # 10/12 Date: 8/28/2024   Tx # : 11/12   Elliptical x 5 min, level 1  Calf stretches on incline 2x 30 secs  TA brace monster walk, lateral, anterior, posterior x 3 rounds  Attempted to use red tube but patient did not maintain good form with performance  TA brace with shoulder extensions, and paloff press x 20 green tube  Shuttle squats N  and ER x 20, 37#  Adductor stretches 2x 30 secs  Hamstrings stretches with strap 2x 30 secs  Piriformis stretches 2x 30 secs (fig 4 no lift)  Modified Brian stretches 2x 30 secs  TA brace with PFM x 10 with 5 secs  TA brace with PFM with OK for add x 10 with 5 secs  TA brace with OK for abd  x 10 with 5 secs  TA brace with bridge x 10  SL clams x 20   [place] Level 1 x10  Diaphragmatic breathing x 2 mins  Thera -ex 45 Elliptical x 5 min, level 1  Calf stretches on incline 2x 30 secs  TA brace monster walk, lateral, anterior, posterior x 3 rounds  TA brace with shoulder extensions, and paloff press x 20 green tube  Shuttle squats N  and ER x 20, 43#  Hamstrings stretches with strap 2x 30 secs  Piriformis stretches 2x 30 secs (fig 4 no lift)  Modified Brian stretches 2x 30 secs  Thera ex - 35 mins  Man Therapy 10 mins  STM and stretching for PF muscles, isolated PF exercises Elliptical x 5 min, level 1  Calf stretches on incline 2x 30 secs  TA brace monster walk, lateral, anterior, posterior x 3 rounds  TA brace with shoulder extensions, and paloff press x 20 small black tube  Shuttle squats N  and ER x 20, 50#  Hamstrings stretches with strap 2x 30 secs  Piriformis stretches 2x 30 secs (fig 4 no lift)  Modified Brian stretches 2x 30 secs  Thera ex - 30 mins  NMR 10 mins  STM and stretching for PF muscles, isolated PF exercises, coordinated TA/TE exercises, breathing exercises     Elliptical x 5 min, level 1  Calf stretches on incline 2x 30 secs  TA brace monster walk, lateral, anterior, posterior x 2 rounds  Anterior and lateral lunges on BOSU ball x  20   TRX squats x 20   Hamstrings stretches with strap 2x 30 secs  Piriformis stretches 2x 30 secs (fig 4 no lift)  Modified Brian stretches 2x 30 secs  [facility] Level [facility], 2 x 20 each   Quadruped TA brace x 10 with 5 secs hols  Attempted Alternate UE/LE lifts, bu patient was unstable, and was discontinues, performed alternating LE lefts in quadruped position instead  Quadruped alternating hip extension x 10  TA brace with bridge x 10 with 5 secs  TA brace with MS push x 10      Thera ex - 40 mins Elliptical x 5 min, level 1  Calf stretches on incline 2x 30 secs  TA brace monster walk, lateral, anterior, posterior x 3 rounds  Anterior lunges x 10  Squatting x 10  Hamstrings stretches with strap 2x 30 secs  Piriformis stretches 2x 30 secs (fig 4 no lift)  Modified Brian stretches 2x 30 secs  [facility], 2 x 20 each   Quadruped TA brace x 10 with 5 secs hols  Quadruped alternating hip extension x 10  Thera ex - 35 mins  Reassessment   NMR for 10 mins - for Pelvic Floor (PF) muscles and Transversus Abdominis (TA) muscles control, improve coordination of PF and TA, improve awareness for contractions and relaxations, improve coordination with respiration, used tactile cues, digital insertion                         HEP: Access Code: [email or web address]  URL: [email or web address]  Date: 08/28/2024  Prepared by: Gwendolyn Carrillo    Exercises  - Supine Butterfly Groin Stretch  - 1 x daily - 7 x weekly - 1 sets - 3 reps - 30 hold  - Supine Figure 4 Piriformis Stretch  - 1 x daily - 7 x weekly - 1 sets - 3 reps - 30 hold  - Supine Hamstring Stretch with Strap  - 2 x daily - 7 x weekly - 1 sets - 3 reps - 30 hold  - Modified Brian Stretch  - 1 x daily - 7 x weekly - 1 sets - 3 reps - 30 hold  - Supine Transversus Abdominis Bracing with Pelvic Floor Contraction  - 1 x daily - 7 x weekly - 2-3 sets - 10 reps - 5 hold  - Pelvic Floor Contractions in Hooklying with Adduction  - 1 x daily - 7 x weekly - 1-2 sets - 10  reps - 5 hold  - Pelvic Floor Contractions in Hooklying with Resisted Abduction  - 1 x daily - 7 x weekly - 1-2 sets - 10 reps - 5 hold  - Supine Bridge  - 2 x daily - 7 x weekly - 1-2 sets - 10 reps  - Clamshell  - 1 x daily - 7 x weekly - 1-2 sets - 10 reps  - Beginner Front Arm Support  - 2 x daily - 7 x weekly - 1-2 sets - 10 reps  - Supine Transversus Abdominis Bracing with Leg Extension  - 2 x daily - 7 x weekly - 1-2 sets - 10 reps  - Supine March  - 2 x daily - 7 x weekly - 1-2 sets - 10 reps  - Sidestepping with Pelvic Floor Contraction  - 2 x daily - 7 x weekly - 1-2 sets  - Backward Monster Walks  - 2 x daily - 7 x weekly - 1-2 sets - 3 reps  - Forward Monster Walks  - 2 x daily - 7 x weekly - 1-2 sets - 3 reps  - Standard Lunge  - 1 x daily - 7 x weekly - 1-2 sets - 10 reps  - Squat  - 1 x daily - 7 x weekly - 1-2 sets - 10 reps  PFDI -20 69.81/300 from 101.04/300    Plan: Patient is now discharged from PT.      Patient/Family/Caregiver was advised of these findings, precautions, and treatment options and has agreed to actively participate in planning and for this course of care.    Thank you for your referral. If you have any questions, please contact me at Dept: [phone].    Sincerely,  Electronically signed by therapist: Gwendolyn Carrillo PT     Physician's certification required:  No  Please co-sign or sign and return this letter via fax as soon as possible to [phone].   I certify the need for these services furnished under this plan of treatment and while under my care.    X___________________________________________________ Date____________________    Certification From: 9/1/2024  To:11/30/2024    Charges: Thera -ex -2, NMR -1       Total Timed Treatment: 45 min  Total Treatment Time: 45 min

## 2024-09-06 ENCOUNTER — APPOINTMENT (OUTPATIENT)
Dept: PHYSICAL THERAPY | Age: 24
End: 2024-09-06
Attending: NURSE PRACTITIONER
Payer: COMMERCIAL

## 2024-09-13 ENCOUNTER — APPOINTMENT (OUTPATIENT)
Dept: PHYSICAL THERAPY | Age: 24
End: 2024-09-13
Attending: NURSE PRACTITIONER
Payer: COMMERCIAL

## 2024-11-18 ENCOUNTER — OFFICE VISIT (OUTPATIENT)
Dept: FAMILY MEDICINE CLINIC | Facility: CLINIC | Age: 24
End: 2024-11-18
Payer: COMMERCIAL

## 2024-11-18 ENCOUNTER — TELEPHONE (OUTPATIENT)
Dept: FAMILY MEDICINE CLINIC | Facility: CLINIC | Age: 24
End: 2024-11-18

## 2024-11-18 VITALS
TEMPERATURE: 97 F | DIASTOLIC BLOOD PRESSURE: 78 MMHG | OXYGEN SATURATION: 97 % | SYSTOLIC BLOOD PRESSURE: 140 MMHG | BODY MASS INDEX: 27.47 KG/M2 | WEIGHT: 175 LBS | RESPIRATION RATE: 18 BRPM | HEIGHT: 67 IN | HEART RATE: 75 BPM

## 2024-11-18 DIAGNOSIS — H66.001 NON-RECURRENT ACUTE SUPPURATIVE OTITIS MEDIA OF RIGHT EAR WITHOUT SPONTANEOUS RUPTURE OF TYMPANIC MEMBRANE: Primary | ICD-10-CM

## 2024-11-18 RX ORDER — CIPROFLOXACIN AND DEXAMETHASONE 3; 1 MG/ML; MG/ML
4 SUSPENSION/ DROPS AURICULAR (OTIC) 2 TIMES DAILY
Qty: 7.5 ML | Refills: 0 | Status: SHIPPED | OUTPATIENT
Start: 2024-11-18

## 2024-11-18 NOTE — TELEPHONE ENCOUNTER
Patient with [facility] appt for ear pain s/p PE tube placed 1.5 weeks ago.  Discussed best to call ENT office.  If unable to be seen by ENT, can examine in [facility].

## 2024-11-18 NOTE — PATIENT INSTRUCTIONS
-600 ibuprofen every 8 hours as needed  -ER advised with worsening symptoms  -Follow up with ENT.
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
## 2025-05-15 ENCOUNTER — APPOINTMENT (OUTPATIENT)
Dept: GENERAL RADIOLOGY | Age: 25
End: 2025-05-15
Attending: EMERGENCY MEDICINE
Payer: COMMERCIAL

## 2025-05-15 ENCOUNTER — HOSPITAL ENCOUNTER (EMERGENCY)
Age: 25
Discharge: HOME | End: 2025-05-15
Attending: EMERGENCY MEDICINE
Payer: COMMERCIAL

## 2025-05-15 VITALS
RESPIRATION RATE: 18 BRPM | HEART RATE: 99 BPM | OXYGEN SATURATION: 100 % | WEIGHT: 195 LBS | SYSTOLIC BLOOD PRESSURE: 133 MMHG | DIASTOLIC BLOOD PRESSURE: 89 MMHG | HEIGHT: 68 IN | TEMPERATURE: 98 F | BODY MASS INDEX: 29.55 KG/M2

## 2025-05-15 DIAGNOSIS — S46.911A STRAIN OF RIGHT SHOULDER, INITIAL ENCOUNTER: Primary | ICD-10-CM
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
## 2025-05-15 PROCEDURE — 73030 X-RAY EXAM OF SHOULDER: CPT | Performed by: EMERGENCY MEDICINE

## 2025-05-15 PROCEDURE — 99284 EMERGENCY DEPT VISIT MOD MDM: CPT

## 2025-05-15 PROCEDURE — 99283 EMERGENCY DEPT VISIT LOW MDM: CPT

## 2025-05-15 RX ORDER — ACETAMINOPHEN 500 MG
1000 TABLET ORAL ONCE
Status: COMPLETED | OUTPATIENT
Start: 2025-05-15 | End: 2025-05-15

## 2025-05-15 RX ORDER — IBUPROFEN 600 MG/1
600 TABLET, FILM COATED ORAL ONCE
Status: COMPLETED | OUTPATIENT
Start: 2025-05-15 | End: 2025-05-15
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
## 2025-05-22 RX ORDER — DROSPIRENONE AND ETHINYL ESTRADIOL 0.02-3(28)
1 KIT ORAL DAILY
Qty: 28 TABLET | Refills: 0 | OUTPATIENT
Start: 2025-05-22

## 2025-06-20 ENCOUNTER — TELEMEDICINE (OUTPATIENT)
Dept: FAMILY MEDICINE CLINIC | Facility: CLINIC | Age: 25
End: 2025-06-20
Payer: COMMERCIAL

## 2025-06-20 DIAGNOSIS — Z30.011 ENCOUNTER FOR INITIAL PRESCRIPTION OF CONTRACEPTIVE PILLS: Primary | ICD-10-CM

## 2025-06-20 PROCEDURE — 98005 SYNCH AUDIO-VIDEO EST LOW 20: CPT | Performed by: STUDENT IN AN ORGANIZED HEALTH CARE EDUCATION/TRAINING PROGRAM

## 2025-06-20 RX ORDER — DROSPIRENONE AND ETHINYL ESTRADIOL 0.02-3(28)
1 KIT ORAL DAILY
Qty: 28 TABLET | Refills: 5 | Status: SHIPPED | OUTPATIENT
Start: 2025-06-20

## 2025-06-20 RX ORDER — METRONIDAZOLE 7.5 MG/G
1 GEL VAGINAL NIGHTLY
COMMUNITY
Start: 2024-07-14 | End: 2025-06-20

## 2025-06-20 NOTE — PROGRESS NOTES
Telemedicine video encounter documentation    This video encounter was conducted with the patient's (or proxy's) verbal consent via secure, interactive audio and video telecommunications.      The patient (or proxy) was instructed to have this encounter in a suitably private space and to only have persons present to whom they give permission to participate. In addition, patient identity was confirmed by use of name plus two identifiers.      Chief Complaint:     Chief Complaint   Patient presents with    Medication Follow-Up     Birth control         Subjective:     Quita Gomes is a 24 year old female established patient. Video visit today for:    1.. Contraception. Patient on oral con [facility] She would like to have this switched to our office. She tolerates the medication well and does not endorse hx of blood clot or tobaco use. She is a former smoker. Of note she does ave a simple liver cyst visualized on 2022 and 2023 abdominal imaging. She has seen GI in the past for gut-related issues but reports that she has not gone forward with further diagnostic testing or management recently.    Problem List[1]    Medications Prior to Visit[2]    Social History and Allergies reviewed.    ROS   See HPI for other ROS    Objective:     Vitals as reported by patient:    Constitutional: well-apearing  Mental status: alert and oriented  Respiratory: no labored breathing, speaks in full sentences      Assessment/Plan:     There are no diagnoses linked to this encounter.      MDM    No follow-ups on file.    [unfilled]    There are no Patient Instructions on file for this visit.        [1]   Patient Active Problem List  Diagnosis    Depressive disorder, not elsewhere classified    Injury, self-inflicted    Social anxiety disorder    Moderate episode of recurrent major depressive disorder (HCC)    Generalized anxiety disorder    Borderline personality disorder (HCC)     Weight loss    Constipation    Generalized abdominal pain    Abdominal pain    Abdominal gas pain    Loss of weight    Abdominal pain, generalized    Ileitis    Intestinal ulcer    Dislocation of jaw   [2]   Outpatient Medications Prior to Visit   Medication Sig Dispense Refill    metroNIDAZOLE 0.75 % Vaginal Gel Place 1 Application vaginally nightly.      OMEPRAZOLE 20 MG Oral Capsule Delayed Release TAKE 1 CAPSULE BY MOUTH EVERY DAY 30 MIN PRIOR TO BREAKFAST 90 capsule 1    escitalopram 20 MG Oral Tab Take 1 tablet (20 mg total) by mouth daily. 90 tablet 0    buPROPion  MG Oral Tablet 24 Hr Take 1 tablet (300 mg total) by mouth every morning. 30 tablet 2    ALPRAZolam 0.25 MG Oral Tab Take 1 tablet (0.25 mg total) by mouth daily as needed for Anxiety. 20 tablet 0    mirtazapine 15 MG Oral Tab TAKE 1 TABLET BY MOUTH EVERY DAY AT NIGHT 90 tablet 0    LUBIPROSTONE 24 MCG Oral Cap TAKE 1 CAPSULE BY MOUTH TWICE A  capsule 0    VESTURA 3-0.02 MG Oral Tab Take 1 tablet by mouth in the morning.      hydrOXYzine 25 MG Oral Tab Take 1 tablet (25 mg total) by mouth nightly as needed (insomnia). 30 tablet 0    gabapentin 300 MG Oral Cap Take 1 capsule (300 mg total) by mouth nightly. 90 capsule 0    hyoscyamine 0.125 MG Sublingual SL Tab Place 1 tablet (125 mcg total) under the tongue every 4 (four) hours as needed.      montelukast 10 MG Oral Tab Take 1 tablet (10 mg total) by mouth in the morning.      ciprofloxacin-dexamethasone 0.3-0.1 % Otic Suspension Place 4 drops into the right ear 2 (two) times daily. (Patient not taking: Reported on 6/20/2025) 7.5 mL 0     No facility-administered medications prior to visit.

## 2025-07-18 ENCOUNTER — OFFICE VISIT (OUTPATIENT)
Dept: OBGYN CLINIC | Facility: CLINIC | Age: 25
End: 2025-07-18
Payer: COMMERCIAL

## 2025-07-18 VITALS — DIASTOLIC BLOOD PRESSURE: 74 MMHG | SYSTOLIC BLOOD PRESSURE: 122 MMHG | BODY MASS INDEX: 30 KG/M2 | WEIGHT: 197 LBS

## 2025-07-18 DIAGNOSIS — N89.8 VAGINAL DISCHARGE: Primary | ICD-10-CM

## 2025-07-18 PROCEDURE — 99212 OFFICE O/P EST SF 10 MIN: CPT | Performed by: STUDENT IN AN ORGANIZED HEALTH CARE EDUCATION/TRAINING PROGRAM

## 2025-07-18 PROCEDURE — 81514 NFCT DS BV&VAGINITIS DNA ALG: CPT | Performed by: STUDENT IN AN ORGANIZED HEALTH CARE EDUCATION/TRAINING PROGRAM
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
## 2025-07-19 LAB
BV BACTERIA DNA VAG QL NAA+PROBE: NEGATIVE
C GLABRATA DNA VAG QL NAA+PROBE: NEGATIVE
C KRUSEI DNA VAG QL NAA+PROBE: NEGATIVE
CANDIDA DNA VAG QL NAA+PROBE: POSITIVE
T VAGINALIS DNA VAG QL NAA+PROBE: NEGATIVE

## (undated) NOTE — LETTER
AUTHORIZATION FOR SURGICAL OPERATION OR OTHER PROCEDURE    1. I hereby authorize Dr. Filipe Mulligan, and [facility] staff assigned to my case to perform the following operation and/or procedure at the [facility]:    _______________________________________________________________________________________________    Nail Avulsion left great toe   _______________________________________________________________________________________________    2.  My physician has explained the nature and purpose of the operation or other procedure, possible alternative methods of treatment, the risks involved, and the possibility of complication to me.  I acknowledge that no guarantee has been made as to the result that may be obtained.  3.  I recognize that, during the course of this operation, or other procedure, unforseen conditions may necessitate additional or different procedure than those listed above.  I, therefore, further authorize and request that the above named physician, his/her physician assistants or designees perform such procedures as are, in his/her professional opinion, necessary and desirable.  4.  Any tissue or organs removed in the operation or other procedure may be disposed of by and at the discretion of the [facility] and [facility].  5.  I understand that in the event of a medical emergency, I will be transported by local paramedics to [facility] or other hospital emergency department.  6.  I certify that I have read and fully understand the above consent to operation and/or other procedure.    7.  I acknowledge that my physician has explained sedation/analgesia administration to me including the risks and benefits.  I consent to the administration of sedation/analgesia as may be necessary or desirable in the judgement of my physician.    Witness signature: ___________________________________________________ Date:  ______/______/_____                     Time:  ________ A.M.  P.M.       Patient Name:  ______________________________________________________  (please print)      Patient signature:  ___________________________________________________             Relationship to Patient:           []  Parent    Responsible person                          []  Spouse  In case of minor or                    [] Other  _____________   Incompetent name:  __________________________________________________                               (please print)      _____________      Responsible person  In case of minor or  Incompetent signature:  _______________________________________________    Statement of Physician  My signature below affirms that prior to the time of the procedure, I have explained to the patient and/or his/her guardian, the risks and benefits involved in the proposed treatment and any reasonable alternative to the proposed treatment.  I have also explained the risks and benefits involved in the refusal of the proposed treatment and have answered the patient's questions.                        Date:  ______/______/_______  Provider                      Signature:  __________________________________________________________       Time:  ___________ A.M    P.M.

## (undated) NOTE — LETTER
9/23/2020          To Whom It May Concern:    Beverlee Lanes is currently under my medical care. Please excuse Cornelio Siemens for missing class on 9/23/20. If you require additional information please contact our office.         Sincerely,    Lydia Simpson DPM

## (undated) NOTE — LETTER
23    Patient: Jaz Bangura  : 2000 Visit date: 2023    Dear  Tatiana Moore DO    Thank you for referring Jaz Bangura to my practice. Please find my assessment and plan below. I saw Martin Rosas in the office, she presents for repeat examination regarding right upper quadrant postprandial pain. On evaluation of the ultrasound patient does have sludge or a polyp impacted within the neck of the gallbladder. I am recommending laparoscopic cholecystectomy however I would like to wait until after she has been seen at [facility] for a second opinion regarding her Crohn's disease.   Thank you Willie  Sincerely,       Toña Cedeno DO   CC:   No Recipients

## (undated) NOTE — LETTER
22    Patient: Amy Lomax  : 2000 Visit date: 2022    Dear  DO Diana    Thank you for referring Amy Lomax to my practice. Please find my assessment and plan below. Ross Jaramillo has history of chronic right lower quadrant abdominal pain. All imaging at this point has been unrevealing. At a lengthy discussion with the patient and her . We discussed option of tertiary referral to [facility]. Patient has chronic pain and she should begin giving consideration for diagnostic laparoscopy. At this time we are planning on proceeding, I am going to get a GYN evaluation so that they can also be in the OR.   Thank you Willie  Sincerely,       Camden Lozano DO   CC: No Recipients

## (undated) NOTE — LETTER
9/16/2020          To Whom It May Concern:    Beverlee Lanes is currently under my medical care. Please excused patient for 9/16/20 due to an office appointment. If you require additional information please contact our office.         Sincerely,    Liana Dutton